# Patient Record
Sex: MALE
[De-identification: names, ages, dates, MRNs, and addresses within clinical notes are randomized per-mention and may not be internally consistent; named-entity substitution may affect disease eponyms.]

---

## 2017-10-10 ENCOUNTER — HOSPITAL (OUTPATIENT)
Dept: OTHER | Age: 47
End: 2017-10-10
Attending: INTERNAL MEDICINE

## 2017-10-10 LAB
DEVICE SN: ABNORMAL
DEVICE SN: NORMAL
POC_GLU: 157 MG/DL (ref 70–99)
POC_GLU: 87 MG/DL (ref 70–99)
TECH_ID: ABNORMAL
TECH_ID: NORMAL

## 2017-10-11 LAB
A/G RATIO_: 0.8
ABS LYMPH: 3 K/CUMM (ref 1–3.5)
ABS MONO: 0.7 K/CUMM (ref 0.1–0.8)
ABS NEUTRO: 3.5 K/CUMM (ref 2–8)
ALBUMIN: 2.4 G/DL (ref 3.5–5)
ALK PHOS: 199 UNIT/L (ref 50–124)
ALT/GPT: 32 UNIT/L (ref 0–55)
ANION GAP SERPL CALC-SCNC: 8 MEQ/L (ref 10–20)
AST/GOT: 16 UNIT/L (ref 5–34)
BASOPHIL: 0 % (ref 0–1)
BILI TOTAL: 1.7 MG/DL (ref 0.2–1)
BUN SERPL-MCNC: 2 MG/DL (ref 6–20)
CALCIUM: 8.9 MG/DL (ref 8.4–10.2)
CHLORIDE: 105 MEQ/L (ref 97–107)
CREATININE: 0.64 MG/DL (ref 0.6–1.3)
DEVICE SN: ABNORMAL
DIFF_TYPE?: ABNORMAL
EOSINOPHIL: 4 % (ref 0–6)
GLOBULIN_: 3.1 G/DL (ref 2–4.1)
GLUCOSE LVL: 122 MG/DL (ref 70–99)
HCT VFR BLD CALC: 24 % (ref 36–51)
HEMOLYSIS 2+: NEGATIVE
HGB BLD-MCNC: 8.4 G/DL (ref 12–17)
IMMATURE GRAN: 0.5 % (ref 0–0.3)
INR: 1.1 (ref 0.9–1.1)
INSTR WBC: 7.6 K/CUMM (ref 4–11)
LIPEMIC 3+: NEGATIVE
LYMPHOCYTE: 39 %
MCH RBC QN AUTO: 36 PG (ref 25–35)
MCHC RBC AUTO-ENTMCNC: 35 G/DL (ref 32–37)
MCV RBC AUTO: 102 FL (ref 78–97)
MONOCYTE: 10 %
NEUTROPHIL: 46 %
NRBC BLD MANUAL-RTO: 0 % (ref 0–0.2)
PLATELET: 245 K/CUMM (ref 150–450)
POC_GLU: 158 MG/DL (ref 70–99)
POC_GLU: 183 MG/DL (ref 70–99)
POC_GLU: 199 MG/DL (ref 70–99)
POC_GLU: 220 MG/DL (ref 70–99)
POC_GLU: 246 MG/DL (ref 70–99)
POC_GLU: 294 MG/DL (ref 70–99)
POTASSIUM: 3.2 MEQ/L (ref 3.5–5.1)
PROTHROMBIN TIME: 11.3 SECONDS (ref 9.7–11.6)
RBC # BLD: 2.36 M/CUMM (ref 4.2–6)
RDW: 18.7 % (ref 11.5–14.5)
SODIUM: 140 MEQ/L (ref 136–145)
TCO2: 30 MEQ/L (ref 19–29)
TECH_ID: ABNORMAL
TOTAL PROTEIN: 5.5 G/DL (ref 6.4–8.3)
WBC # BLD: 7.6 K/CUMM (ref 4–11)

## 2017-10-12 LAB
A/G RATIO_: 0.7
ABS LYMPH: 2 K/CUMM (ref 1–3.5)
ABS MONO: 0.4 K/CUMM (ref 0.1–0.8)
ABS NEUTRO: 5.1 K/CUMM (ref 2–8)
ALBUMIN: 2.3 G/DL (ref 3.5–5)
ALK PHOS: 193 UNIT/L (ref 50–124)
ALT/GPT: 25 UNIT/L (ref 0–55)
ANION GAP SERPL CALC-SCNC: 11 MEQ/L (ref 10–20)
AST/GOT: 15 UNIT/L (ref 5–34)
BASOPHIL: 0 % (ref 0–1)
BILI TOTAL: 1.8 MG/DL (ref 0.2–1)
BUN SERPL-MCNC: 5 MG/DL (ref 6–20)
CALCIUM: 8.6 MG/DL (ref 8.4–10.2)
CHLORIDE: 105 MEQ/L (ref 97–107)
CREATININE: 0.69 MG/DL (ref 0.6–1.3)
DEVICE SN: ABNORMAL
DIFF_TYPE?: ABNORMAL
EOSINOPHIL: 0 % (ref 0–6)
GLOBULIN_: 3.4 G/DL (ref 2–4.1)
GLUCOSE LVL: 217 MG/DL (ref 70–99)
HCT VFR BLD CALC: 26 % (ref 36–51)
HEMOLYSIS 2+: NEGATIVE
HEMOLYSIS 2+: NEGATIVE
HGB BLD-MCNC: 8.6 G/DL (ref 12–17)
IMMATURE GRAN: 0.8 % (ref 0–0.3)
INSTR WBC: 7.6 K/CUMM (ref 4–11)
LIPEMIC 3+: NEGATIVE
LYMPHOCYTE: 26 %
MAGNESIUM LEVEL: 1.5 MG/DL (ref 1.6–2.6)
MCH RBC QN AUTO: 34 PG (ref 25–35)
MCHC RBC AUTO-ENTMCNC: 33 G/DL (ref 32–37)
MCV RBC AUTO: 103 FL (ref 78–97)
MONOCYTE: 6 %
NEUTROPHIL: 66 %
NRBC BLD MANUAL-RTO: 0 % (ref 0–0.2)
PLATELET: 236 K/CUMM (ref 150–450)
POC_GLU: 204 MG/DL (ref 70–99)
POC_GLU: 248 MG/DL (ref 70–99)
POC_GLU: 298 MG/DL (ref 70–99)
POC_GLU: 306 MG/DL (ref 70–99)
POTASSIUM: 3.8 MEQ/L (ref 3.5–5.1)
RBC # BLD: 2.52 M/CUMM (ref 4.2–6)
RDW: 17.8 % (ref 11.5–14.5)
SODIUM: 137 MEQ/L (ref 136–145)
TCO2: 25 MEQ/L (ref 19–29)
TECH_ID: ABNORMAL
TOTAL PROTEIN: 5.7 G/DL (ref 6.4–8.3)
UA APPEAR: CLEAR
UA BILI: NEGATIVE
UA BLOOD: NEGATIVE
UA COLOR: YELLOW
UA GLUCOSE: NEGATIVE
UA KETONES: NEGATIVE
UA LEUK EST: NEGATIVE
UA NITRITE: NEGATIVE
UA PH: 6 (ref 5–7)
UA PROTEIN: NEGATIVE
UA SPEC GRAV: 1.02 (ref 1.01–1.02)
UA UROBILINOGEN: 0.2 MG/DL (ref 0.2–1)
WBC # BLD: 7.6 K/CUMM (ref 4–11)

## 2017-10-13 LAB
A/G RATIO_: 0.7
ABS LYMPH: 2.6 K/CUMM (ref 1–3.5)
ABS MONO: 0.7 K/CUMM (ref 0.1–0.8)
ABS NEUTRO: 4.4 K/CUMM (ref 2–8)
ALBUMIN: 2.3 G/DL (ref 3.5–5)
ALK PHOS: 166 UNIT/L (ref 50–124)
ALT/GPT: 22 UNIT/L (ref 0–55)
ANION GAP SERPL CALC-SCNC: 9 MEQ/L (ref 10–20)
AST/GOT: 14 UNIT/L (ref 5–34)
BASOPHIL: 1 % (ref 0–1)
BILI TOTAL: 1.5 MG/DL (ref 0.2–1)
BUN SERPL-MCNC: 5 MG/DL (ref 6–20)
CALCIUM: 8.3 MG/DL (ref 8.4–10.2)
CHLORIDE: 104 MEQ/L (ref 97–107)
CREATININE: 0.79 MG/DL (ref 0.6–1.3)
DEVICE SN: ABNORMAL
DIFF_TYPE?: ABNORMAL
EOSINOPHIL: 3 % (ref 0–6)
GLOBULIN_: 3.3 G/DL (ref 2–4.1)
GLUCOSE LVL: 344 MG/DL (ref 70–99)
HCT VFR BLD CALC: 26 % (ref 36–51)
HEMOLYSIS 2+: NEGATIVE
HEMOLYSIS 2+: NEGATIVE
HGB BLD-MCNC: 8.6 G/DL (ref 12–17)
IMMATURE GRAN: 0.7 % (ref 0–0.3)
INSTR WBC: 8.1 K/CUMM (ref 4–11)
LIPEMIC 3+: NEGATIVE
LYMPHOCYTE: 33 %
MAGNESIUM LEVEL: 1.7 MG/DL (ref 1.6–2.6)
MCH RBC QN AUTO: 34 PG (ref 25–35)
MCHC RBC AUTO-ENTMCNC: 33 G/DL (ref 32–37)
MCV RBC AUTO: 104 FL (ref 78–97)
MONOCYTE: 8 %
NEUTROPHIL: 54 %
NRBC BLD MANUAL-RTO: 0 % (ref 0–0.2)
PLATELET: 240 K/CUMM (ref 150–450)
PLT ESTIMATE: ADEQUATE
POC_GLU: 138 MG/DL (ref 70–99)
POTASSIUM: 3.7 MEQ/L (ref 3.5–5.1)
RBC # BLD: 2.52 M/CUMM (ref 4.2–6)
RBC MORPH2: ABNORMAL
RBC MORPH: ABNORMAL
RDW: 17.6 % (ref 11.5–14.5)
SODIUM: 137 MEQ/L (ref 136–145)
TCO2: 28 MEQ/L (ref 19–29)
TECH_ID: ABNORMAL
TOTAL PROTEIN: 5.6 G/DL (ref 6.4–8.3)
WBC # BLD: 8.1 K/CUMM (ref 4–11)

## 2018-01-26 ENCOUNTER — HOSPITAL (OUTPATIENT)
Dept: OTHER | Age: 48
End: 2018-01-26
Attending: EMERGENCY MEDICINE

## 2018-01-26 LAB
A/G RATIO_: 1
ABS LYMPH: 4.8 K/CUMM (ref 1–3.5)
ABS MONO: 0.9 K/CUMM (ref 0.1–0.8)
ABS NEUTRO: 5.6 K/CUMM (ref 2–8)
ALBUMIN: 3.7 G/DL (ref 3.5–5)
ALK PHOS: 126 UNIT/L (ref 50–124)
ALT/GPT: 15 UNIT/L (ref 0–55)
ANION GAP SERPL CALC-SCNC: 15 MEQ/L (ref 10–20)
AST/GOT: 14 UNIT/L (ref 5–34)
BASOPHIL: 1 % (ref 0–1)
BILI TOTAL: 0.3 MG/DL (ref 0.2–1)
BUN SERPL-MCNC: 18 MG/DL (ref 6–20)
CALCIUM: 9.6 MG/DL (ref 8.4–10.2)
CHLORIDE: 100 MEQ/L (ref 97–107)
CREATININE: 0.94 MG/DL (ref 0.6–1.3)
DIFF_TYPE?: ABNORMAL
EOSINOPHIL: 2 % (ref 0–6)
GLOBULIN_: 3.6 G/DL (ref 2–4.1)
GLUCOSE LVL: 322 MG/DL (ref 70–99)
HCT VFR BLD CALC: 46 % (ref 36–51)
HEMOLYSIS 2+: ABNORMAL
HEMOLYSIS 4+: NEGATIVE
HEMOLYSIS 4+: NORMAL
HGB BLD-MCNC: 15.8 G/DL (ref 12–17)
ICTERIC 4+: NEGATIVE
IMMATURE GRAN: 0.5 % (ref 0–0.3)
INSTR WBC: 11.7 K/CUMM (ref 4–11)
LACTIC ACID: 1.7 MMOL/L (ref 0.5–2.2)
LIPASE LEVEL: 43 UNIT/L (ref 8–78)
LIPEMIC 1+: NEGATIVE
LIPEMIC 3+: NEGATIVE
LYMPHOCYTE: 41 %
MCH RBC QN AUTO: 31 PG (ref 25–35)
MCHC RBC AUTO-ENTMCNC: 34 G/DL (ref 32–37)
MCV RBC AUTO: 90 FL (ref 78–97)
MONOCYTE: 8 %
NEUTROPHIL: 48 %
NRBC BLD MANUAL-RTO: 0 % (ref 0–0.2)
PLATELET: 193 K/CUMM (ref 150–450)
POTASSIUM: 4.4 MEQ/L (ref 3.5–5.1)
RBC # BLD: 5.15 M/CUMM (ref 4.2–6)
RDW: 13.8 % (ref 11.5–14.5)
SODIUM: 133 MEQ/L (ref 136–145)
TCO2: 22 MEQ/L (ref 19–29)
TOTAL PROTEIN: 7.3 G/DL (ref 6.4–8.3)
TROPONIN I: <0.01 NG/ML
UA APPEAR: CLEAR
UA BILI: NEGATIVE
UA BLOOD: NEGATIVE
UA COLOR: YELLOW
UA GLUCOSE: ABNORMAL
UA KETONES: ABNORMAL
UA LEUK EST: NEGATIVE
UA NITRITE: NEGATIVE
UA PH: 5.5 (ref 5–7)
UA PROTEIN: NEGATIVE
UA SPEC GRAV: 1.02 (ref 1.01–1.02)
UA UROBILINOGEN: 0.2 MG/DL (ref 0.2–1)
WBC # BLD: 11.7 K/CUMM (ref 4–11)

## 2018-04-02 ENCOUNTER — HOSPITAL (OUTPATIENT)
Dept: OTHER | Age: 48
End: 2018-04-02
Attending: INTERNAL MEDICINE

## 2018-04-02 LAB
A/G RATIO_: 1.1
ABS LYMPH: 3.2 K/CUMM (ref 1–3.5)
ABS MONO: 0.7 K/CUMM (ref 0.1–0.8)
ABS NEUTRO: 5 K/CUMM (ref 2–8)
ALBUMIN: 4 G/DL (ref 3.5–5)
ALK PHOS: 107 UNIT/L (ref 50–124)
ALT/GPT: 17 UNIT/L (ref 0–55)
ANION GAP SERPL CALC-SCNC: 12 MEQ/L (ref 10–20)
AST/GOT: 14 UNIT/L (ref 5–34)
BASOPHIL: 1 % (ref 0–1)
BILI TOTAL: 0.4 MG/DL (ref 0.2–1)
BUN SERPL-MCNC: 17 MG/DL (ref 6–20)
CALCIUM: 9.9 MG/DL (ref 8.4–10.2)
CHLORIDE: 103 MEQ/L (ref 97–107)
CREATININE: 0.84 MG/DL (ref 0.6–1.3)
DEVICE SN: ABNORMAL
DEVICE SN: ABNORMAL
DIFF_TYPE?: NORMAL
EOSINOPHIL: 2 % (ref 0–6)
GLOBULIN_: 3.6 G/DL (ref 2–4.1)
GLUCOSE LVL: 278 MG/DL (ref 70–99)
HCT VFR BLD CALC: 46 % (ref 36–51)
HEMOLYSIS 2+: NEGATIVE
HGB BLD-MCNC: 15.9 G/DL (ref 12–17)
IMMATURE GRAN: 0.4 % (ref 0–0.3)
INR: 0.9 (ref 0.9–1.1)
INSTR WBC: 9.2 K/CUMM (ref 4–11)
LIPEMIC 3+: NEGATIVE
LYMPHOCYTE: 35 %
MCH RBC QN AUTO: 33 PG (ref 25–35)
MCHC RBC AUTO-ENTMCNC: 35 G/DL (ref 32–37)
MCV RBC AUTO: 94 FL (ref 78–97)
MONOCYTE: 8 %
NEUTROPHIL: 55 %
NRBC BLD MANUAL-RTO: 0 % (ref 0–0.2)
PLATELET: 193 K/CUMM (ref 150–450)
POC_GLU: 137 MG/DL (ref 70–99)
POC_GLU: 249 MG/DL (ref 70–99)
POTASSIUM: 4.3 MEQ/L (ref 3.5–5.1)
PROTHROMBIN TIME: 9.5 SECONDS (ref 9.7–11.6)
RBC # BLD: 4.85 M/CUMM (ref 4.2–6)
RDW: 12.6 % (ref 11.5–14.5)
SODIUM: 135 MEQ/L (ref 136–145)
TCO2: 24 MEQ/L (ref 19–29)
TECH_ID: ABNORMAL
TECH_ID: ABNORMAL
TOTAL PROTEIN: 7.6 G/DL (ref 6.4–8.3)
WBC # BLD: 9.2 K/CUMM (ref 4–11)

## 2020-05-17 ENCOUNTER — APPOINTMENT (OUTPATIENT)
Dept: CT IMAGING | Facility: HOSPITAL | Age: 50
DRG: 871 | End: 2020-05-17
Attending: EMERGENCY MEDICINE
Payer: MEDICAID

## 2020-05-17 ENCOUNTER — HOSPITAL ENCOUNTER (INPATIENT)
Facility: HOSPITAL | Age: 50
LOS: 11 days | Discharge: HOME | DRG: 871 | End: 2020-05-28
Attending: EMERGENCY MEDICINE | Admitting: HOSPITALIST
Payer: MEDICAID

## 2020-05-17 DIAGNOSIS — A41.9 SEPSIS DUE TO UNDETERMINED ORGANISM (HCC): Primary | ICD-10-CM

## 2020-05-17 DIAGNOSIS — N12 PYELONEPHRITIS: ICD-10-CM

## 2020-05-17 DIAGNOSIS — N17.9 AKI (ACUTE KIDNEY INJURY) (HCC): ICD-10-CM

## 2020-05-17 DIAGNOSIS — E87.1 HYPONATREMIA: ICD-10-CM

## 2020-05-17 DIAGNOSIS — E11.65 TYPE 2 DIABETES MELLITUS WITH HYPERGLYCEMIA, WITHOUT LONG-TERM CURRENT USE OF INSULIN (HCC): ICD-10-CM

## 2020-05-17 PROCEDURE — 74177 CT ABD & PELVIS W/CONTRAST: CPT | Performed by: EMERGENCY MEDICINE

## 2020-05-17 PROCEDURE — 99223 1ST HOSP IP/OBS HIGH 75: CPT | Performed by: HOSPITALIST

## 2020-05-17 RX ORDER — SODIUM CHLORIDE 0.9 % (FLUSH) 0.9 %
3 SYRINGE (ML) INJECTION AS NEEDED
Status: DISCONTINUED | OUTPATIENT
Start: 2020-05-17 | End: 2020-05-28

## 2020-05-17 RX ORDER — INSULIN ASPART 100 [IU]/ML
0.2 INJECTION, SOLUTION INTRAVENOUS; SUBCUTANEOUS ONCE
Status: COMPLETED | OUTPATIENT
Start: 2020-05-17 | End: 2020-05-17

## 2020-05-17 RX ORDER — ONDANSETRON 2 MG/ML
4 INJECTION INTRAMUSCULAR; INTRAVENOUS EVERY 6 HOURS PRN
Status: DISCONTINUED | OUTPATIENT
Start: 2020-05-17 | End: 2020-05-28

## 2020-05-17 RX ORDER — HEPARIN SODIUM 5000 [USP'U]/ML
5000 INJECTION, SOLUTION INTRAVENOUS; SUBCUTANEOUS EVERY 12 HOURS SCHEDULED
Status: COMPLETED | OUTPATIENT
Start: 2020-05-17 | End: 2020-05-24

## 2020-05-17 RX ORDER — SODIUM CHLORIDE 9 MG/ML
INJECTION, SOLUTION INTRAVENOUS CONTINUOUS
Status: DISCONTINUED | OUTPATIENT
Start: 2020-05-17 | End: 2020-05-18

## 2020-05-17 RX ORDER — ACETAMINOPHEN 500 MG
1000 TABLET ORAL ONCE
Status: COMPLETED | OUTPATIENT
Start: 2020-05-17 | End: 2020-05-17

## 2020-05-17 RX ORDER — ONDANSETRON 2 MG/ML
4 INJECTION INTRAMUSCULAR; INTRAVENOUS EVERY 4 HOURS PRN
Status: COMPLETED | OUTPATIENT
Start: 2020-05-17 | End: 2020-05-19

## 2020-05-17 RX ORDER — DEXTROSE MONOHYDRATE 25 G/50ML
50 INJECTION, SOLUTION INTRAVENOUS
Status: DISCONTINUED | OUTPATIENT
Start: 2020-05-17 | End: 2020-05-28

## 2020-05-17 RX ORDER — SODIUM CHLORIDE 9 MG/ML
INJECTION, SOLUTION INTRAVENOUS CONTINUOUS
Status: DISCONTINUED | OUTPATIENT
Start: 2020-05-17 | End: 2020-05-19

## 2020-05-17 RX ORDER — ONDANSETRON 2 MG/ML
4 INJECTION INTRAMUSCULAR; INTRAVENOUS ONCE
Status: COMPLETED | OUTPATIENT
Start: 2020-05-17 | End: 2020-05-17

## 2020-05-17 NOTE — ED INITIAL ASSESSMENT (HPI)
Pt reports vomiting x3 days. Reports dizziness and lack of energy. AOx4. Denies abd pain, urinary symptoms, fever.

## 2020-05-17 NOTE — ED NOTES
Cultures drawn by [facility] PCT at 1825.   Abx started at [facility]

## 2020-05-18 ENCOUNTER — APPOINTMENT (OUTPATIENT)
Dept: CV DIAGNOSTICS | Facility: HOSPITAL | Age: 50
DRG: 871 | End: 2020-05-18
Attending: HOSPITALIST
Payer: MEDICAID

## 2020-05-18 PROCEDURE — 99255 IP/OBS CONSLTJ NEW/EST HI 80: CPT | Performed by: INTERNAL MEDICINE

## 2020-05-18 PROCEDURE — 93306 TTE W/DOPPLER COMPLETE: CPT | Performed by: HOSPITALIST

## 2020-05-18 PROCEDURE — 99221 1ST HOSP IP/OBS SF/LOW 40: CPT | Performed by: NURSE PRACTITIONER

## 2020-05-18 PROCEDURE — 99222 1ST HOSP IP/OBS MODERATE 55: CPT | Performed by: INTERNAL MEDICINE

## 2020-05-18 PROCEDURE — 99233 SBSQ HOSP IP/OBS HIGH 50: CPT | Performed by: HOSPITALIST

## 2020-05-18 RX ORDER — METOPROLOL TARTRATE 5 MG/5ML
5 INJECTION INTRAVENOUS EVERY 6 HOURS PRN
Status: DISCONTINUED | OUTPATIENT
Start: 2020-05-18 | End: 2020-05-28

## 2020-05-18 RX ORDER — ACETAMINOPHEN 325 MG/1
650 TABLET ORAL EVERY 6 HOURS PRN
Status: DISCONTINUED | OUTPATIENT
Start: 2020-05-18 | End: 2020-05-28

## 2020-05-18 NOTE — PLAN OF CARE
Patient alert, reports feeling weak and ill. No episodes of vomiting. Received zofran before lunch. S/S and base dose insulin Novolog and levemir ordered per Endocrinology. IV abx infusing. Replacing electrolytes per Nephrology.    Urology consult pen request assistance  - Assess pain using appropriate pain scale  - Administer analgesics based on type and severity of pain and evaluate response  - Implement non-pharmacological measures as appropriate and evaluate response  - Consider cultural and social Administer ordered medications to maintain glucose within target range  - Assess barriers to adequate nutritional intake and initiate nutrition consult as needed  - Instruct patient on self management of diabetes  Outcome: Progressing  Goal: Hemodynamic st cognitive ability or social support system  Outcome: Not Progressing     Problem: METABOLIC/FLUID AND ELECTROLYTES - ADULT  Goal: Electrolytes maintained within normal limits  Description  INTERVENTIONS:  - Monitor labs and rhythm and assess patient for si

## 2020-05-18 NOTE — ED PROVIDER NOTES
Patient Seen in: [facility] AND [facility] Emergency Department    History   Patient presents with:  Vomiting  Dizziness      HPI    The patient presents to the ED complaining of feeling ill for the past 3 days.   Reports nausea and frequent vomiting over the pas Device None (Room air)       All measures to prevent infection transmission during my interaction with the patient were taken. The patient was already wearing a droplet mask on my arrival to the room.  Personal protective equipment including droplet mask, e Urine Large (*)     Protein Urine 100  (*)     WBC Urine 20 (*)     RBC URINE 8 (*)     Bacteria Urine Few (*)     All other components within normal limits   MANUAL DIFFERENTIAL - Abnormal; Notable for the following components:    Neutrophil Absolute [facility] GLUCOSE - Abnormal; Notable for the following components:    POC Glucose  304 (*)     All other components within normal limits   CBC W/ DIFFERENTIAL - Abnormal; Notable for the following components:    WBC 27.4 (*)     Neutrophil Absolute Prelim 21.99 (*) intrarenal/perinephric fluid collection is apparent at this time to suggest abscess. However, short-term post treatment follow-up is advised as the appearance of the left kidney raises suspicion for impending abscess formation.  2. Borderline enlarged left range)     Or   Glucose-Vitamin C (DEX-4) chewable tab 8 tablet (has no administration in time range)   Insulin Regular Human (NOVOLIN R) 100 UNIT/ML injection 1-11 Units (9 Units Subcutaneous Given 5/17/20 1257)   insulin detemir (LEVEMIR) 100 UNIT/ML fle procedures and reviewed those reports. Complicating Factors: The patient already has does not have any pertinent problems on file. to contribute to the complexity of this ED evaluation.     ED Course: Patient presents to the ED with vomiting for the past undetermined organism ([facility] A41.9 5/17/2020     LENIN (acute kidney injury) ([facility] N17.9 5/17/2020     Hyponatremia E87.1 5/17/2020     Pyelonephritis N12 5/17/2020     Sepsis ([facility] A41.9 5/17/2020 Unknown    Type 2 diabetes mellitus with hyperglycemia, witho

## 2020-05-18 NOTE — CONSULTS
[facility]    Report of Consultation    Dallasaura Jefferson Patient Status:  Inpatient    1970 MRN [id number]   Location [facility] 2W/SW Attending Lawrence Gray, [address] [facility] Day # 1 PCP Destiny Hunter MD     Date of Admis reactive. Mild bilateral hydroureteronephrosis without visible obstructing calculus, in keeping with ascending urinary tract infection/vesicoureteral reflux. UA with large blood, negative leuks, WBC 20, RBC 8.   Urine culture preliminary shows > 100,000 testicular pain and nocturia. Musculoskeletal: Negative for gait problem. Neurological: Negative for dizziness and light-headedness. Physical Exam:   Vital Signs:   height is 5' 6\" (1.676 m) and weight is 172 lb 11.2 oz (78.3 kg).  His temporal t most consistent with extensive left greater than right pyelonephritis. No well-defined intrarenal/perinephric fluid collection is apparent at this time to suggest abscess.   However, short-term post treatment follow-up is advised as the appearance of the l feels like he is emptying his bladder adequately. Kidney function improving. Hold off on marino placement for now. Patient is diabetic and poorly compliant. Endocrinology is following. Recommendations:  1.  Continue meropenem pending urine cultu

## 2020-05-18 NOTE — PROGRESS NOTES
ADMISSION NOTE    48year old male noncompliant diabetic,  presents with vomiting, dizziness and fatigue. Found to have wbc 27k  with bilateral pyelonephritis, LENIN and glucose > 500 with Na of 118 . Available medical records partially reviewed.  Dictation

## 2020-05-18 NOTE — PLAN OF CARE
Pt A&O x 4, on room air. Sinus tach on the monitor. 13 beats of SVT at 2229 and hypertensive, Dr Nathaniel Trevino made aware. IV fluids infusing. Zero residuals post void bladder scan x 2. Denies pain or nausea. Pt resting comfortably in bed.  Belongings in the c Identify cognitive and physical deficits and behaviors that affect risk of falls.   - [place] fall precautions as indicated by assessment.  - Educate pt/family on patient safety including physical limitations  - Instruct pt to call for assistance with act

## 2020-05-18 NOTE — PROGRESS NOTES
Report received from [facility]. Pt arrived from ED with belongings (black bag and clothes) to room 221. Connected to monitor. Educated on call light controls and safety precautions.

## 2020-05-18 NOTE — PROGRESS NOTES
[facility] Pharmacy Note:  Renal Dose Adjustment    Meropenem (Merrem) dosing had been previously adjusted by pharmacy due to renal insufficiency for Emeka Llamas. Estimated Creatinine Clearance: 70 mL/min (based on SCr of 1.14 mg/dL).     Due to improved eric

## 2020-05-18 NOTE — PROGRESS NOTES
[place] [facility]    Progress Note    Humberto Gill Patient Status:  Inpatient    1970 MRN [id number]   Location [facility] 2W/SW Attending Israel Crowe, [address] [facility] Se Day # 1 PCP Ayde Corral MD       Subjective:     Pt f nature. Trace left perinephric and perisplenic ascites is also likely reactive. 3. Mild bilateral hydroureteronephrosis without visible obstructing calculus, in keeping with ascending urinary tract infection/vesicoureteral reflux.  4. Fatty liver/hepatomeg consult    Tobacco use disorder.    - counseled on cessation    dvt proph: heparin    Code status:  Full    >35 minutes spent    Misael Overton MD  5/18/2020

## 2020-05-18 NOTE — H&P
[facility]    PATIENT'S NAME: Maria Antonia Sierra   ATTENDING PHYSICIAN: Anitra Benton MD   PATIENT ACCOUNT#:   [id number]    LOCATION:  [address] [place] [address] RECORD #:   [id number]       YOB: 1970  ADMISSION DATE:       05/17/2020 nephrogram involving the inter polar region of the right kidney. Findings are most consistent with extensive left greater than right pyelonephritis.   There was no well-defined interrenal perinephric fluid collection to suggest abscess at this time; maria del carmen about 3 years ago. REVIEW OF SYSTEMS:  Twelve systems were reviewed. The patient denies any URI symptoms. Does report that his appetite has been poor over the past few days. Denies any shortness of breath, cough, chest pain.   There are otherwise no a 14.4 with an INR of 1.14. White count was 27,000 with a hemoglobin of 14 and a platelet count of 664,930. There were 79% neutrophils and 19% bands. ASSESSMENT AND PLAN:    1. Acute bilateral pyelonephritis.   CT scan shows some mild bilateral hydrone heparin, SCDs. 8.   GI prophylaxis. Protonix. 9.   Tobacco use disorder. Discussed cessation. 10. The patient's current clinical status and proposed treatment plan were discussed with him.   All of his questions were answered, and he agreed with the

## 2020-05-18 NOTE — CONSULTS
[facility]    Report of Consultation    Ashwin Landa Patient Status:  Inpatient    1970 MRN [id number]   Location [facility] 2W/SW Attending Grge Patel MD   Hosp Day # 1 PCP Tucker Andrews MD     Date of Adm CC  •  Insulin Aspart Pen (NOVOLOG) 100 UNIT/ML flexpen 1-7 Units, 1-7 Units, Subcutaneous, TID CC  •  Meropenem (MERREM) 500 mg in sodium chloride 0.9% 100 mL MBP, 500 mg, Intravenous, Q8H  •  ondansetron HCl (ZOFRAN) injection 4 mg, 4 mg, Intravenous, Q4 is consulted for inpatient DM management. PLAN:   - Levemir 24 daily  - Novolog 6 along with medium dose CF with meals  - Accuchecks AC and HS  - Hypoglycemia protocol  - Diabetic diet  - Diabetes education consult    Thank you for the consult.  We will

## 2020-05-18 NOTE — CONSULTS
[facility]    PATIENT'S NAME: Judge Haskins   ATTENDING PHYSICIAN: Misael Overton MD   CONSULTING PHYSICIAN: Tiffanie Nicole MD   PATIENT ACCOUNT#:   [id number]    LOCATION:  [address] [place] [address] RECORD #:   [id number]       DATE OF BIRTH: Works as a . He is . FAMILY HISTORY:  Positive for diabetes, but he is not really aware of any family history of kidney problems. REVIEW OF SYSTEMS:  The patient currently states he is feeling much better.   No further nausea and v compliance. 3.   Possible history of hypertension. PLAN:  We will start to wean him off IV fluids. Start diet as per Dr. Ila Antony. Will follow with I and O's, daily weights, and serial chemistries. Replace potassium and phosphorus.   Compliance with

## 2020-05-18 NOTE — ED NOTES
Assumed care of patient from [place]. Patient on full continuous monitoring at this time. Patient currently resting in ED stretcher. He is alert, oriented.  Offered blanket and turned lights down to promote rest.

## 2020-05-18 NOTE — CM/SW NOTE
MDO for Advanced Directives:    Discussed with patient- he prefers sister as primary contact. Explained surrogady  act and progression without HCPOA filled out. HCPOA form provided and he will think about it and fill out later.     [place] Surrogacy [place] order a

## 2020-05-18 NOTE — PAYOR COMM NOTE
ADMITTED TO ICU    ADMISSION REVIEW     Payor: Saira Modi #:  [id number]  Authorization Number: [id number]    Admit date: 5/17/20  Admit time: 1       Admitting Physician:   Attending Physician:  Isela Carranza MD  Primary Care Physician: Mona Butcher and Sexual Activity      Alcohol use: Yes        Frequency: Monthly or less        Drinks per session: 1 or 2        Binge frequency: Never      Drug use: Never      ROS  Pertinent Positives: Nausea vomiting weakness and dehydration  All other organ system has a normal mood and affect. His behavior is normal.   Nursing note and vitals reviewed.       ED Course        Labs Reviewed   BASIC METABOLIC PANEL (8) - Abnormal; Notable for the following components:       Result Value    Glucose 572 (*)     Sodium 118 Glucose  572 (*)     All other components within normal limits   POCT GLUCOSE - Abnormal; Notable for the following components:    POC Glucose  565 (*)     All other components within normal limits   POCT GLUCOSE - Abnormal; Notable for the following compo EKG    Rate, intervals and axes as noted on EKG Report.   Rate: Tachycardic  Rhythm: Sinus Rhythm  Reading: Sinus tachycardia, inferior Q waves, abnormal EKG             Imaging Results Available and Reviewed while in ED: Ct Abdomen Pelvis Iv Contrast, mL (has no administration in time range)   0.9% NaCl infusion ( Intravenous New Bag 5/17/20 2321)   Heparin Sodium (Porcine) 5000 UNIT/ML injection 5,000 Units (5,000 Units Subcutaneous Given 5/17/20 2323)   ondansetron HCl (ZOFRAN) injection 4 mg (has no (!) 143/110   BP Location:   Right arm Right arm   Pulse:  (!) 122 110 107   Resp:  18 (!) 28 (!) 28   Temp: 99.6 °F (37.6 °C)      TempSrc: Temporal      SpO2:  93% 95% 95%   Weight:       Height:         *I personally reviewed and interpreted all ED arian time spent performing procedures.       Condition upon leaving the department: Stable    Disposition and Plan     Clinical Impression:  Sepsis due to undetermined organism ([facility]  (primary encounter diagnosis)  Pyelonephritis  Type 2 diabetes mellitus with h has had no hematemesis. He denies any fever or chills. No cough, shortness of breath. No chest pain. No obvious sick contacts.   He is diabetic and per Care Everywhere, has been admitted for uncontrolled diabetes in the past.  The patient states he does obstructing calculus in keeping with the ascending urinary tract infection or vesicoureteral reflux. There was no bladder distention.   The patient was started on Zosyn in the emergency room, given insulin, and admitted for further evaluation and treatment Temperature was 103.1 in the emergency room, 97.4 after arrival on the medical floor, pulse 110, respiratory rate 20, blood pressure 140/100, O2 saturation 95% on room air. HEENT:  Normocephalic, atraumatic. Pupils equal, reactive. Sclerae anicteric. Blood and urine cultures are pending at this time. 2.   Patient did present septic with a lactic acid greater than 3. He was given sepsis fluid bolus and repeat lactic acid level in 3 hours normalized to 1.1.   We will continue IV hydration, both for seps Lori Craft, RN      acetaminophen (TYLENOL EXTRA STRENGTH) tab 1,000 mg     Date Action Dose Route User    5/17/2020 1834 Given 1000 mg Oral Malon Crigler, RN      Heparin Sodium (Porcine) 5000 UNIT/ML injection 5,000 Units     Date Action Dose Route User    5/18 User    5/18/2020 0501 Given 12.5 mg Oral Elpidioribernabe Monroy RN    5/18/2020 0109 Given 12.5 mg Oral Terell Monroy RN      ondansetron HCl [facility]) injection 4 mg     Date Action Dose Route User    5/17/2020 1644 Given 4 mg Intravenous Deborah Guzman RN room, he had a temperature of 103.1 associated with a heart rate of 130 and a respiratory rate of up to 34. His blood pressures were elevated. Routine laboratory studies showed a blood sugar of 572 with an A1c of 14.0.   His sodium was 118 with a BUN and Turgor is adequate. NECK:  Supple without JVD or lymphadenopathy. LUNGS:  Clear to auscultation percussion. HEART:  Regular rate and rhythm with an S4 but no other gallops, murmurs, or rubs. ABDOMEN:  Soft, flat, nontender.   Without organomegaly, charlie

## 2020-05-19 ENCOUNTER — APPOINTMENT (OUTPATIENT)
Dept: CT IMAGING | Facility: HOSPITAL | Age: 50
DRG: 871 | End: 2020-05-19
Attending: NURSE PRACTITIONER
Payer: MEDICAID

## 2020-05-19 PROCEDURE — 99233 SBSQ HOSP IP/OBS HIGH 50: CPT | Performed by: UROLOGY

## 2020-05-19 PROCEDURE — 74178 CT ABD&PLV WO CNTR FLWD CNTR: CPT | Performed by: NURSE PRACTITIONER

## 2020-05-19 PROCEDURE — 99232 SBSQ HOSP IP/OBS MODERATE 35: CPT | Performed by: INTERNAL MEDICINE

## 2020-05-19 PROCEDURE — 99233 SBSQ HOSP IP/OBS HIGH 50: CPT | Performed by: HOSPITALIST

## 2020-05-19 RX ORDER — HYDROCODONE BITARTRATE AND ACETAMINOPHEN 5; 325 MG/1; MG/1
1 TABLET ORAL EVERY 6 HOURS PRN
Status: DISCONTINUED | OUTPATIENT
Start: 2020-05-19 | End: 2020-05-28

## 2020-05-19 RX ORDER — NICOTINE 21 MG/24HR
1 PATCH, TRANSDERMAL 24 HOURS TRANSDERMAL DAILY
Status: DISCONTINUED | OUTPATIENT
Start: 2020-05-19 | End: 2020-05-28

## 2020-05-19 RX ORDER — SODIUM CHLORIDE 9 MG/ML
INJECTION, SOLUTION INTRAVENOUS CONTINUOUS
Status: DISCONTINUED | OUTPATIENT
Start: 2020-05-19 | End: 2020-05-22

## 2020-05-19 RX ORDER — HYDROMORPHONE HYDROCHLORIDE 1 MG/ML
1 INJECTION, SOLUTION INTRAMUSCULAR; INTRAVENOUS; SUBCUTANEOUS EVERY 4 HOURS PRN
Status: DISCONTINUED | OUTPATIENT
Start: 2020-05-19 | End: 2020-05-28

## 2020-05-19 NOTE — PROGRESS NOTES
[place] [facility] - [facility]    Progress Note    Ashwin Landa Patient Status:  Inpatient    1970 MRN [id number]   Location [facility] 2W/SW Attending Chintan Braun, [address] [facility] Se Day # 2 PCP Tucker Andrews MD       Subjective:     Pt f Increasing pneumobilia throughout the intrahepatic and extrahepatic biliary tree. 6. Borderline left para-aortic lymphadenopathy, which may be reactive.   7. Small to moderate left and small right pleural effusions, with associated compressive atelectasis, pneumobilia. These findings may be related to cholecystectomy state. Request correlation with obstructive laboratory parameters. 6. Small left pleural effusion. Incidentally noted small bilateral hydroceles. 7. Sequelae of remote granulomatous disease.

## 2020-05-19 NOTE — CONSULTS
[place] [facility] - [facility] ID CONSULT NOTE    Xenia Trevino Patient Status:  Inpatient    1970 MRN [id number]   Location [facility] 2W/SW [facility] 2 PCP Sharath North MD       Reason for C Allergies    Medications:    Current Facility-Administered Medications:   •  Insulin Aspart Pen (NOVOLOG) 100 UNIT/ML flexpen 3 Units, 3 Units, Subcutaneous, TID CC  •  sodium phosphate 40 mEq in sodium chloride 0.9% 250 mL IVPB, 40 mEq, Intravenous, Once itching. CARDIOVASCULAR:  No chest pain, chest pressure or chest discomfort  RESPIRATORY:  No shortness of breath, cough or sputum. GASTROINTESTINAL:  No +anorexia, +nausea, +vomiting or diarrhea. No abdominal pain or blood.   GENITOURINARY:  No [place] when he started have generalized weakness and doing self hydration on the drive to [facility]. Sx started 3 days PTA. Also with some left back pain but otherwise no fevers, chills, dysuria, hematuria, abdominal pain or any other focal symptoms.   On adm

## 2020-05-19 NOTE — PROGRESS NOTES
[place] [facility] - [facility]    Progress Note    Reynaldo  Patient Status:  Inpatient    1970 MRN [id number]   Location [facility] 2W/SW Attending Luc Beaulieu, [address] [facility] Day # 2 PCP Yenni Aguilar MD       Subjective:   Chiquita Wood CREATSERUM 0.90 05/19/2020    BUN 18 05/19/2020     (L) 05/19/2020    K 3.4 (L) 05/19/2020     05/19/2020    CO2 25.0 05/19/2020    GLU 70 05/19/2020    CA 8.4 (L) 05/19/2020    ALB 1.7 (L) 05/19/2020    ALKPHO 111 05/18/2020    AST 28 05/18/20 Iv Contrast, No Oral (er)    Result Date: 5/17/2020  CONCLUSION:  1. Markedly abnormal appearance of the left kidney with multifocal left renal striated nephrogram.  Additional focal striated nephrogram involving the interpolar region of the right kidney.

## 2020-05-19 NOTE — PROGRESS NOTES
[facility]  Nephrology Daily Progress Note    Leighton Orozco  [id number]  48year old      HPI:   Leighton Orozco is a 48year old male. Feeling better. Ate breakfast well. No UTI symptoms.        ROS:     Constitutional:  Negative for decr skills appropriate for age    Labs:  Lab Results   Component Value Date    WBC 16.5 05/19/2020    HGB 11.3 05/19/2020    HCT 32.7 05/19/2020    .0 05/19/2020    CREATSERUM 0.90 05/19/2020    BUN 18 05/19/2020     05/19/2020    K 3.4 05/19/2020 visible obstructing calculus, in keeping with ascending urinary tract infection/vesicoureteral reflux. 4. Fatty liver/hepatomegaly. 5. Suspected cholecystectomy. Mild left intrahepatic and extrahepatic biliary ductal dilation with left lobe pneumobilia. Q15 Min PRN **OR** Glucose-Vitamin C (DEX-4) chewable tab 4 tablet, 4 tablet, Oral, Q15 Min PRN **OR** dextrose 50 % injection 50 mL, 50 mL, Intravenous, Q15 Min PRN **OR** glucose (DEX4) oral liquid 30 g, 30 g, Oral, Q15 Min PRN **OR** Glucose-Vitamin C (

## 2020-05-19 NOTE — PROGRESS NOTES
[facility]    Progress Note    Baylee Velazco Patient Status:  Inpatient    1970 MRN [id number]   Location [facility] 2W/SW Attending Mago Pete MD   Hosp Day # 2 PCP Diane Rosenbaum MD     Subjective:  Feels t

## 2020-05-19 NOTE — PLAN OF CARE
HYPOGLYCEMIA  BS= 69  GAVE DEX GLUCOSE  RECHECKING BS  NOTIFYING [facility].

## 2020-05-19 NOTE — PLAN OF CARE
Patient resting comfortably overnight. In the early morning he began having some left flank pain, PRN Tylenol given. He is still having decreased appetite, but reports he is feeling better than when he came in. Vital signs as charted.      Problem: Patient ADULT  Goal: Verbalizes/displays adequate comfort level or patient's stated pain goal  Description  INTERVENTIONS:  - Encourage pt to monitor pain and request assistance  - Assess pain using appropriate pain scale  - Administer analgesics based on type and to be responsible for managing their own health  - Refer to Case Management Department for coordinating discharge planning if the patient needs post-hospital services based on physician/LIP order or complex needs related to functional status, cognitive elgin weight  - Monitor urine specific gravity, serum osmolarity and serum sodium as indicated or ordered  - Monitor response to interventions for patient's volume status, including labs, urine output, blood pressure (other measures as available)  - Encourage or

## 2020-05-20 PROCEDURE — 99233 SBSQ HOSP IP/OBS HIGH 50: CPT | Performed by: HOSPITALIST

## 2020-05-20 PROCEDURE — 99222 1ST HOSP IP/OBS MODERATE 55: CPT | Performed by: SURGERY

## 2020-05-20 PROCEDURE — 99231 SBSQ HOSP IP/OBS SF/LOW 25: CPT | Performed by: NURSE PRACTITIONER

## 2020-05-20 PROCEDURE — 99232 SBSQ HOSP IP/OBS MODERATE 35: CPT | Performed by: INTERNAL MEDICINE

## 2020-05-20 RX ORDER — POTASSIUM CHLORIDE 20 MEQ/1
40 TABLET, EXTENDED RELEASE ORAL EVERY 4 HOURS
Status: COMPLETED | OUTPATIENT
Start: 2020-05-20 | End: 2020-05-20

## 2020-05-20 RX ORDER — PANTOPRAZOLE SODIUM 40 MG/1
40 TABLET, DELAYED RELEASE ORAL
Status: DISCONTINUED | OUTPATIENT
Start: 2020-05-21 | End: 2020-05-28

## 2020-05-20 NOTE — PROGRESS NOTES
[place] [facility]  Progress Note    Kellee Styles Patient Status:  Inpatient    1970 MRN [id number]   Location [facility] 2W/SW Attending Nakul Delgadillo MD   Hosp Day # 3 PCP Daniel Sierra MD       Sub to 14.9 today. Kidney function normalized. Fever curve improving with a 24 hour tmax of 100. 3.       Patient found to have urinary retention on admission of 224 ml. No suprapubic pain.   He is urinating without difficulty.       Patient is diabetic and ascites. 5. Increasing pneumobilia throughout the intrahepatic and extrahepatic biliary tree. 6. Borderline left para-aortic lymphadenopathy, which may be reactive.   7. Small to moderate left and small right pleural effusions, with associated compressive

## 2020-05-20 NOTE — PROGRESS NOTES
Surgery Consult    #pneumobilia sp lap chol in 2017 and ERCP with stent placement for choledocholithiasis. He returned to [address] [place] [address] 2 weeks  for a retained CBD stone and had another stent placed.   He did have a sphincterotomy which can give rise to pneumobil

## 2020-05-20 NOTE — PLAN OF CARE
Patient is a/ox4. RA. Up with standby assist. IVF @ 50 ml/hr. Accucheck ACHS. Febrile today, tylenol given. Continent. Potassium replace. Plan is to transfer to medical floor once a bed becomes available. Will continue to monitor.     Problem: Patient Nikita ADULT  Goal: Verbalizes/displays adequate comfort level or patient's stated pain goal  Description  INTERVENTIONS:  - Encourage pt to monitor pain and request assistance  - Assess pain using appropriate pain scale  - Administer analgesics based on type and to be responsible for managing their own health  - Refer to Case Management Department for coordinating discharge planning if the patient needs post-hospital services based on physician/LIP order or complex needs related to functional status, cognitive elgin weight  - Monitor urine specific gravity, serum osmolarity and serum sodium as indicated or ordered  - Monitor response to interventions for patient's volume status, including labs, urine output, blood pressure (other measures as available)  - Encourage or

## 2020-05-20 NOTE — DIABETES ED
[facility]    Diabetes Education  Note    Tayla Asencio Patient Status:  Inpatient   1970 MRN [id number]  Location [facility] 2W/SW Attending Riaz Foy MD  Hosp Day # 3 PCP Mao Patterson MD    Reason for Visit: symptoms of hypo- and hyperglycemia and treatment  · Actions of long acting insulin  · Introduction to carbohydrates      Patient verbalized understanding, was appreciative of the visit.     Recommendations:    Patient to administer subcutaneously insulin i

## 2020-05-20 NOTE — PROGRESS NOTES
[place] [facility] - [facility]    Progress Note    Erum Jean Patient Status:  Inpatient    1970 MRN [id number]   Location [facility] 2W/SW Attending Fiona Parmar, [address] [facility] Se Day # 3 PCP Poppy Carvajal MD     Subjective:  Feels t

## 2020-05-20 NOTE — PROGRESS NOTES
[place] [facility]    Ashwin Landa Patient Status:  Inpatient    1970 MRN [id number]   Location [facility] 2W/SW Attending Dudley Pabon MD   Hosp Day # 3 PCP Tucker Andrews MD     Subjective:  ROS re chills, dysuria, hematuria, abdominal pain or any other focal symptoms. On admission febrile to 103.1 with WBC 27.4.  UA WBC 20.  Lactic acid of 3.3 with serum bicarb 22. Blood glucose of 572. LENIN. 19% bands. Noted to be hypertensive and tachycardic.

## 2020-05-20 NOTE — PROGRESS NOTES
[facility] Note: Route Optimization for Pantoprazole (PROTONIX)    Patient is currently on Pantoprazole (PROTONIX) 40 mg IV every 24 hours.    The patient meets the criteria to convert to the oral equivalent as established by the IV to Oral conversion pro

## 2020-05-20 NOTE — PLAN OF CARE
Problem: Patient Centered Care  Goal: Patient preferences are identified and integrated in the patient's plan of care  Description  Interventions:  - What would you like us to know as we care for you?  \"I have not eaten in four days\"  - Provide timely, based on type and severity of pain and evaluate response  - Implement non-pharmacological measures as appropriate and evaluate response  - Consider cultural and social influences on pain and pain management  - Manage/alleviate anxiety  - Utilize distractio status, cognitive ability or social support system  Outcome: Progressing     Problem: GASTROINTESTINAL - ADULT  Goal: Minimal or absence of nausea and vomiting  Description  INTERVENTIONS:  - Maintain adequate hydration with IV or PO as ordered and tolerat available)  - Encourage oral intake as appropriate  - Instruct patient on fluid and nutrition restrictions as appropriate  Outcome: Progressing   Patient states he is feeling better, but still does have some left flank pain.  Diastolic blood pressure remain

## 2020-05-20 NOTE — PROGRESS NOTES
[facility]  Progress Note     Crystal Logan  : 1970    Status: Inpatient  Day #: 3    Attending: Erum Fitzgerald MD  PCP: Janie Nye MD      Assessment and Plan     Sepsis due to bilateral pyelonephritis.   Acute bilateral raimundo 6205  Gross per 24 hour   Intake 4050 ml   Output 2230 ml   Net 1820 ml         Recent Labs   Lab 05/18/20  0450 05/19/20  0429 05/20/20  0411   WBC 16.6* 16.5* 14.9*   HGB 12.9* 11.3* 11.5*   HCT 37.3* 32.7* 33.9*   .0 144.0* 148.0*   RBC 4.36 3.77 ascites. 5. Increasing pneumobilia throughout the intrahepatic and extrahepatic biliary tree. 6. Borderline left para-aortic lymphadenopathy, which may be reactive.   7. Small to moderate left and small right pleural effusions, with associated compressive

## 2020-05-21 PROCEDURE — 99232 SBSQ HOSP IP/OBS MODERATE 35: CPT | Performed by: INTERNAL MEDICINE

## 2020-05-21 PROCEDURE — 99232 SBSQ HOSP IP/OBS MODERATE 35: CPT | Performed by: UROLOGY

## 2020-05-21 PROCEDURE — 99233 SBSQ HOSP IP/OBS HIGH 50: CPT | Performed by: HOSPITALIST

## 2020-05-21 PROCEDURE — 99231 SBSQ HOSP IP/OBS SF/LOW 25: CPT | Performed by: SURGERY

## 2020-05-21 NOTE — CONSULTS
[facility]    PATIENT'S NAME: Katharine Barnhart   ATTENDING PHYSICIAN: Neymar Delgado MD   CONSULTING PHYSICIAN: Dariusz Ramey MD   PATIENT ACCOUNT#:   [id number]    LOCATION:  [address] [place] [address] Rd #:   [id number]       YOB: 1970 auscultation. FLANKS:  There is some left flank tenderness. ABDOMEN:  Soft, nontender, nondistended. NEUROLOGIC:  Nonfocal.    IMPRESSION:  Pneumobilia, most likely secondary to his sphincterotomy in the past.  No surgical intervention.      Thank y

## 2020-05-21 NOTE — PROGRESS NOTES
[facility]    Progress Note    Conchetta Huge Patient Status:  Inpatient    1970 MRN [id number]   Location [facility] 2W/SW Attending Florina Mora, [address] [facility] Se Day # 4 PCP Krishna Colón MD     Subjective:  Feels t Sindy Blankenship MD  5/21/2020

## 2020-05-21 NOTE — PLAN OF CARE
Problem: Patient Centered Care  Goal: Patient preferences are identified and integrated in the patient's plan of care  Description  Interventions:  - What would you like us to know as we care for you?  \"I have not eaten in four days\"  - Provide timely, based on type and severity of pain and evaluate response  - Implement non-pharmacological measures as appropriate and evaluate response  - Consider cultural and social influences on pain and pain management  - Manage/alleviate anxiety  - Utilize distractio Glucose maintained within prescribed range  Description  INTERVENTIONS:  - Monitor Blood Glucose as ordered  - Assess for signs and symptoms of hyperglycemia and hypoglycemia  - Administer ordered medications to maintain glucose within target range  - Asse

## 2020-05-21 NOTE — PROGRESS NOTES
Double RN skin check done prior to transfer off Unit. Skin check performed by this RN and Ieva. Wounds are as follows: no wounds     Will remain available for any further questions or concerns.

## 2020-05-21 NOTE — PLAN OF CARE
Blood sugars improved. Eating well. Steady gait.  Pain controlled with norco.  Problem: Patient Centered Care  Goal: Patient preferences are identified and integrated in the patient's plan of care  Description  Interventions:  - What would you like us to kn assistance  - Assess pain using appropriate pain scale  - Administer analgesics based on type and severity of pain and evaluate response  - Implement non-pharmacological measures as appropriate and evaluate response  - Consider cultural and social influenc services based on physician/LIP order or complex needs related to functional status, cognitive ability or social support system  Outcome: Progressing     Problem: GASTROINTESTINAL - ADULT  Goal: Minimal or absence of nausea and vomiting  Description  INTER including labs, urine output, blood pressure (other measures as available)  - Encourage oral intake as appropriate  - Instruct patient on fluid and nutrition restrictions as appropriate  Outcome: Progressing

## 2020-05-21 NOTE — PROGRESS NOTES
[place] [facility]    Progress Note    Merwyn Flatness Patient Status:  Inpatient    1970 MRN [id number]   Location [facility] 2W/SW Attending Rj Day MD   [address] Day # 4 PCP Hood Mccann MD       Subjective:   Negrita Alcala

## 2020-05-21 NOTE — PROGRESS NOTES
[facility]  OPERATIVE REPORT    Name:  Sebastian Rosenbaum  MR#:   [id number]  :  1978  ACCOUNT #:  [de-identified]  DATE OF SERVICE:  10/24/2019    PREOPERATIVE DIAGNOSIS:  Familial painful lipomatosis. POSTOPERATIVE DIAGNOSIS:  Familial painful lipomatosis with multiple subcutaneous soft tissue lipomata in extremities and trunk. PROCEDURE PERFORMED:  Excision of total 20 lipomata from right and left upper extremities and chest areas. SURGEON:  Tiffany Mehta MD    ASSISTANT:  Nicole Cedeno. ANESTHESIA:  General endotracheal.    COMPLICATIONS:  None. SPECIMENS REMOVED:  Containers with specimens from right upper extremity, left upper extremity, and chest area through separate containers. IMPLANTS:  There were no implants placed. ESTIMATED BLOOD LOSS:  Minimal.    PROCEDURE:  This patient has a history of familial lipomatosis with multiple lipomas in the extremities and the trunk area, some of which are extremely painful. The patient had requested removal of as many as possible, and we have decided on a total of 20 of these masses from the left and right upper extremities and 2 large painful ones on the chest area. These were marked in the preop holding area as large circular ink marks surrounding the palpable masses outlined by the patient. He was taken to the operating room, placed on the operating table in the supine position and appropriately prepped and sterilely draped. Each of these masses was approached by way of a short linear incision, allowing for dissection using a scissors to undermine from the surface and then by digital compression to squeeze the mass out of its bed, containing the capsule and then cauterizing the small vascular stalk at its base before placing it in a sterile container. The total of 10 of these tumors were removed from the left upper extremity, and these measured between 2 and 4 cm in size.   A total of eight were removed from the right upper [place] [facility]    Progress Note    Alecia Beaver Patient Status:  Inpatient    1970 MRN [id number]   Location [facility] 2W/SW Attending Edgar Bai, [address] [facility] [address] Day # 7 PCP Huyen Ramos MD     Subjective  Feels we extremity, also averaging 2-4 cm in size, and two specimens were removed from the right side chest and left side upper chest, these being one 4 cm and the other 5 cm. Closure of these incisions was done using Monocryl 4-0 suture and attaching the dermis back to the deep fascia so as to obliterate the deep space in each case, followed by the placement of Prineo Dermabond squares over each of these incisions with the cyanoacrylate adhesive painted on to its surface. No additional dressings were placed, and the patient was able to be awakened and transferred to the recovery room in a stable and semi-awake condition.         Stuart Santiago MD      [id number]/  D:  10/24/2019 23:59  T:  10/25/2019 7:25  JOB #:  [id number] to prevent short and long term complications from Diabetes.      Lois Genao MD

## 2020-05-21 NOTE — PROGRESS NOTES
[facility]    Progress Note    Danny Osborne Patient Status:  Inpatient    1970 MRN [id number]   Location [facility] 2W/SW Attending Mary Hernández MD   Hosp Day # 4 PCP Odell Dewitt MD     Assessment and Plan:     Abran Bruner 05/21/2020    CO2 26.0 05/21/2020     (H) 05/21/2020    CA 8.4 (L) 05/21/2020    ALB 1.7 (L) 05/20/2020    ALKPHO 111 05/18/2020    BILT 0.5 05/18/2020    TP 6.8 05/18/2020    AST 28 05/18/2020    ALT 20 05/18/2020    INR 1.14 05/17/2020    MG 2.1 0

## 2020-05-21 NOTE — PROGRESS NOTES
[place] [facility]    Reyna Cardenas Patient Status:  Inpatient    1970 MRN [id number]   Location [facility] 2W/SW Attending Chester Briseno MD   Hosp Day # 4 PCP Juana Petersen MD     Subjective:  ROS re self hydration on the drive to [facility]. Sx started 3 days PTA. Also with some left back pain but otherwise no fevers, chills, dysuria, hematuria, abdominal pain or any other focal symptoms.   On admission febrile to 103.1 with WBC 27.4.  UA WBC 20.  Lactic

## 2020-05-21 NOTE — PROGRESS NOTES
[facility]  Progress Note     Reba Homero  : 1970    Status: Inpatient  Day #: 4    Attending: Julieta Alfaro MD  PCP: Gina Cortez MD      Assessment and Plan     Sepsis due to bilateral pyelonephritis.   Acute bilateral raimundo nonfocal  Psychiatric:  Normal affect, calm and appropriate    Intake/Output Summary (Last 24 hours) at 5/21/2020 1336  Last data filed at 5/21/2020 3857  Gross per 24 hour   Intake 1905 ml   Output 2110 ml   Net -205 ml         Recent Labs   Lab 05/19/20 Heparin Sodium (Porcine)  5,000 Units Subcutaneous 2 times per day      PRN Meds: HYDROmorphone HCl, HYDROcodone-acetaminophen, acetaminophen, metoprolol Tartrate, Normal Saline Flush, ondansetron HCl, glucose **OR** Glucose-Vitamin C **OR** dextrose **OR*

## 2020-05-22 PROCEDURE — 99232 SBSQ HOSP IP/OBS MODERATE 35: CPT | Performed by: UROLOGY

## 2020-05-22 PROCEDURE — 99233 SBSQ HOSP IP/OBS HIGH 50: CPT | Performed by: HOSPITALIST

## 2020-05-22 NOTE — PLAN OF CARE
Pt denies sob, chills, fever, n/v. Pain controled with medication. IV fluids run without difficulty, no redness, no infiltration. Medication reviewed. Safety maintained. Call light in reach. We will continue to monitor.   Problem: Patient [address] Verbalizes/displays adequate comfort level or patient's stated pain goal  Description  INTERVENTIONS:  - Encourage pt to monitor pain and request assistance  - Assess pain using appropriate pain scale  - Administer analgesics based on type and severity of responsible for managing their own health  - Refer to Case Management Department for coordinating discharge planning if the patient needs post-hospital services based on physician/LIP order or complex needs related to functional status, cognitive ability o Monitor urine specific gravity, serum osmolarity and serum sodium as indicated or ordered  - Monitor response to interventions for patient's volume status, including labs, urine output, blood pressure (other measures as available)  - Encourage oral intake

## 2020-05-22 NOTE — PLAN OF CARE
Problem: Patient Centered Care  Goal: Patient preferences are identified and integrated in the patient's plan of care  Description  Interventions:  - What would you like us to know as we care for you?  \"I have not eaten in four days\"  - Provide timely, based on type and severity of pain and evaluate response  - Implement non-pharmacological measures as appropriate and evaluate response  - Consider cultural and social influences on pain and pain management  - Manage/alleviate anxiety  - Utilize distractio status, cognitive ability or social support system  Outcome: Progressing     Problem: GASTROINTESTINAL - ADULT  Goal: Minimal or absence of nausea and vomiting  Description  INTERVENTIONS:  - Maintain adequate hydration with IV or PO as ordered and tolerat available)  - Encourage oral intake as appropriate  - Instruct patient on fluid and nutrition restrictions as appropriate  Outcome: Progressing English

## 2020-05-22 NOTE — PROGRESS NOTES
[facility]  [facility] Note     Marsha Mejia  : 1970    Status: Inpatient  Day #: 5    Attending: Pratima Gonzalez MD  PCP: Isidro Saunders MD      Assessment and Plan     Sepsis due to bilateral pyelonephritis.   Acute bilateral raimundo edema, no cyanosis, no clubbing  Neurologic:  nonfocal  Psychiatric:  Normal affect, calm and appropriate    Intake/Output Summary (Last 24 hours) at 5/22/2020 0911  Last data filed at 5/22/2020 0720  Gross per 24 hour   Intake 1770.83 ml   Output 3575 ml nicotine  1 patch Transdermal Daily   • Insulin Aspart Pen  1-7 Units Subcutaneous TID CC   • metoprolol Tartrate  25 mg Oral 2x Daily(Beta Blocker)   • Heparin Sodium (Porcine)  5,000 Units Subcutaneous 2 times per day      PRN Meds: HYDROmorphone HCl, HY

## 2020-05-22 NOTE — PROGRESS NOTES
[facility]    Progress Note    Alecia Beaver Patient Status:  Inpatient    1970 MRN [id number]   Location [facility] 5SW/SE Attending Jourdan Henley MD   [facility] Day # 5 PCP Huyen Ramos MD       Subjective:   Steve Diaz

## 2020-05-22 NOTE — PAYOR COMM NOTE
--------------  CONTINUED STAY REVIEW    Payor: John Schmitzkorey #:  [id number]  Authorization Number: [id number]    Admit date: 5/17/20  Admit time: 1    Admitting Physician: Debora Fish MD  Attending Physician:  So Howell MD  Primary Care Physic  Continue on IV cefepime. If continued improvement, anticipate DC on PO ciprofloxacin until 6/2/20.  -  Repeat blood cx NGTD. -  Follow fever curve, wbc.  Repeat CBC/diff     PER UROLOGY  Assessment and Plan:   Recommend:  -Check post void residual.  If gr Route User    5/22/2020 9651 Given 2 Units Subcutaneous (Left Upper Abdomen) Livia Bhatti RN    5/21/2020 1846 Given 2 Units Subcutaneous (Left Lower Abdomen) Livia Bhatti RN    5/21/2020 1325 Given 2 Units Subcutaneous (Left Upper Arm) Watson

## 2020-05-23 ENCOUNTER — APPOINTMENT (OUTPATIENT)
Dept: GENERAL RADIOLOGY | Facility: HOSPITAL | Age: 50
DRG: 871 | End: 2020-05-23
Attending: UROLOGY
Payer: MEDICAID

## 2020-05-23 ENCOUNTER — APPOINTMENT (OUTPATIENT)
Dept: CT IMAGING | Facility: HOSPITAL | Age: 50
DRG: 871 | End: 2020-05-23
Attending: UROLOGY
Payer: MEDICAID

## 2020-05-23 PROCEDURE — 99232 SBSQ HOSP IP/OBS MODERATE 35: CPT | Performed by: INTERNAL MEDICINE

## 2020-05-23 PROCEDURE — 99233 SBSQ HOSP IP/OBS HIGH 50: CPT | Performed by: HOSPITALIST

## 2020-05-23 PROCEDURE — 99233 SBSQ HOSP IP/OBS HIGH 50: CPT | Performed by: UROLOGY

## 2020-05-23 PROCEDURE — 71046 X-RAY EXAM CHEST 2 VIEWS: CPT | Performed by: UROLOGY

## 2020-05-23 PROCEDURE — 74178 CT ABD&PLV WO CNTR FLWD CNTR: CPT | Performed by: UROLOGY

## 2020-05-23 NOTE — PLAN OF CARE
Problem: Patient Centered Care  Goal: Patient preferences are identified and integrated in the patient's plan of care  Description  Interventions:  - What would you like us to know as we care for you?  \"I have not eaten in four days\"  - Provide timely,

## 2020-05-23 NOTE — PROGRESS NOTES
[facility] JAYLAN ID PROGRESS NOTE    Betinajennifer Akhtar Patient Status:  Inpatient    1970 MRN [id number]   Location [facility] 2W/SW Attending Lazara Booth MD   Hosp Day # 6 PCP Margy Quinn MD     Subjective:  ROS re hydration on the drive to [facility]. Sx started 3 days PTA. Also with some left back pain but otherwise no fevers, chills, dysuria, hematuria, abdominal pain or any other focal symptoms.   On admission febrile to 103.1 with WBC 27.4.  UA WBC 20.  Lactic acid

## 2020-05-23 NOTE — PROGRESS NOTES
Dallas Jefferson is a 48year old male. HPI:   Patient presents with:  Vomiting  Dizziness    WEEKEND CALL COVERING DR. Ragini Manzanares      History provided by patient and review of records    Admitted 5/17/2020 with urosepsis. Pancultured.   Patient initially on (TYLENOL) tab 650 mg, 650 mg, Oral, Q6H PRN  •  metoprolol Tartrate (LOPRESSOR) 5 MG/5ML injection 5 mg, 5 mg, Intravenous, Q6H PRN  •  Insulin Aspart Pen (NOVOLOG) 100 UNIT/ML flexpen 1-7 Units, 1-7 Units, Subcutaneous, TID CC  •  metoprolol Tartrate (LOP Results (last three years):  Recent Labs     05/17/20  1550   COLORUR Yellow   CLARITY Hazy*   SPECGRAVITY 1.018   PHURINE 6.0   PROUR 100 *   GLUUR >=500*   KETUR 20 *   BILUR Negative   BLOODURINE Large*   NITRITE Negative   UROBILINOGEN <2.0   LEUUR Neg changes throughout the spine. Bladder Scan Volume (mL): 69 mL      Review of previous records:    Admitted 5/17/2020 with urosepsis; temperature 103.1; CBC WBC 27,400. Urine culture Klebsiella pneumoniae pneumoniae.   Initially treated with IV Zosyn, superimposed pneumonia\"     Dr. Miladis Paula M.D.                    Brown Dyson MD

## 2020-05-23 NOTE — PROGRESS NOTES
[facility]  Hospitalist Progress Note     Oralee Arms Patient Status:  Inpatient    1970  48year old CSN [id number]   Location 542/542-A Attending Isis Da Silva MD   Hosp Day # 6 PCP Tori Maddox MD     ASSESSMENT/PLAN 29.9 29.7 29.1   MCHC 33.8 33.3 32.8   RDW 13.9 14.0 14.1   NEPRELIM 8.36* 11.04* 12.67*   WBC 10.9 14.3* 16.7*   .0 257.0 333.0     Recent Labs   Lab 05/17/20  1550 05/17/20  2256 05/18/20  0450 05/19/20  0429 05/20/20  0411  05/21/20  0341 05/22/2 spent discussing his lab work, particularly his white blood cell count. Discussed urine culture results. We discussed plan for reimaging versus discharge tomorrow.

## 2020-05-23 NOTE — PLAN OF CARE
Pt denies sob, chest pain, n/v. Pain controled with medication. Medication reviewed, Call light in reach. Reinforced teaching in insulin administration. Pt returned teach back. Safety maintained. Call light in reach. We will continue to monitor.   Problem: - ADULT  Goal: Verbalizes/displays adequate comfort level or patient's stated pain goal  Description  INTERVENTIONS:  - Encourage pt to monitor pain and request assistance  - Assess pain using appropriate pain scale  - Administer analgesics based on type a ability to be responsible for managing their own health  - Refer to Case Management Department for coordinating discharge planning if the patient needs post-hospital services based on physician/LIP order or complex needs related to functional status, cogni weight  - Monitor urine specific gravity, serum osmolarity and serum sodium as indicated or ordered  - Monitor response to interventions for patient's volume status, including labs, urine output, blood pressure (other measures as available)  - Encourage or

## 2020-05-24 PROCEDURE — 99233 SBSQ HOSP IP/OBS HIGH 50: CPT | Performed by: UROLOGY

## 2020-05-24 PROCEDURE — 99232 SBSQ HOSP IP/OBS MODERATE 35: CPT | Performed by: INTERNAL MEDICINE

## 2020-05-24 PROCEDURE — 99233 SBSQ HOSP IP/OBS HIGH 50: CPT | Performed by: HOSPITALIST

## 2020-05-24 NOTE — PLAN OF CARE
Started meropenem last night per ID. Notified Dr Tr Werner and ID of CT results. Afebrile, vitals stable.     Problem: Patient Centered Care  Goal: Patient preferences are identified and integrated in the patient's plan of care  Description  Interventions: monitor pain and request assistance  - Assess pain using appropriate pain scale  - Administer analgesics based on type and severity of pain and evaluate response  - Implement non-pharmacological measures as appropriate and evaluate response  - Consider cul patient needs post-hospital services based on physician/LIP order or complex needs related to functional status, cognitive ability or social support system  Outcome: Progressing     Problem: GASTROINTESTINAL - ADULT  Goal: Minimal or absence of nausea and for patient's volume status, including labs, urine output, blood pressure (other measures as available)  - Encourage oral intake as appropriate  - Instruct patient on fluid and nutrition restrictions as appropriate  Outcome: Progressing

## 2020-05-24 NOTE — PROGRESS NOTES
[place] [facility]    Progress Note    Ashwin Landa Patient Status:  Inpatient    1970 MRN [id number]   Location [facility] 2W/SW Attending Chintan Braun, [address] [facility] Se Day # 7 PCP Tucker Andrews MD     Subjective  Feels we MD

## 2020-05-24 NOTE — PROGRESS NOTES
[place] [facility]    Liz Solis Patient Status:  Inpatient    1970 MRN [id number]   Location [facility] 2W/SW Attending Mor Shipley MD   Hosp Day # 7 PCP Charli Del Toro MD     Subjective:  Spiked meropenem)     72-year-old male with a history of diabetes x4 years not on meds, poorly controlled with A1c of 14 who is a  and was in [place] when he started have generalized weakness and doing self hydration on the drive to [facility].  Sx started [facility]  [phone]    ID Attending:  Labs and scans reviewed. All issues discussed. Agree with PA notes and plan  Cont IV meropenem.  Anticipate at least 14 days Abx (may need up to 6 weeks Rx)  Await IR drainage

## 2020-05-24 NOTE — PROGRESS NOTES
Mallory Watkins is a 48year old male. HPI:   Patient presents with:  Vomiting  Dizziness    WEEKEND CALL COVERING DR. Richardson Generous      History provided by patient and review of records    Admitted 5/17/2020 with urosepsis. Pancultured.   Patient initially on 650 mg, Oral, Q6H PRN  •  metoprolol Tartrate (LOPRESSOR) 5 MG/5ML injection 5 mg, 5 mg, Intravenous, Q6H PRN  •  Insulin Aspart Pen (NOVOLOG) 100 UNIT/ML flexpen 1-7 Units, 1-7 Units, Subcutaneous, TID CC  •  metoprolol Tartrate (LOPRESSOR) tab 25 mg, 25 Labs     05/17/20  1550   COLORUR Yellow   CLARITY Hazy*   SPECGRAVITY 1.018   PHURINE 6.0   PROUR 100 *   GLUUR >=500*   KETUR 20 *   BILUR Negative   BLOODURINE Large*   NITRITE Negative   UROBILINOGEN <2.0   LEUUR Negative   WBCUR 20*   RBCUR 8*   BACUR left kidney with extension of gas beyond the margins of the posterior left renal cortex and into left ureters fascia. ... Left kidney demonstrates impaired excretion with suggestion of ATN of left kidney. ...  Heterogeneous enhancement of right kidney concern cefepime which patient was receiving until evening of 5/23/2020, but then with worsening fever, worsening leukocytosis and new CT showing developing left perirenal abscess, switched back to IV meropenem  6. Diabetes mellitus--poorly compliant  7.   [place]

## 2020-05-24 NOTE — PROGRESS NOTES
[facility]  Progress Note     Leighton Orozco  : 1970    Status: Inpatient  Day #: 7    Attending: Nitish Saldivar MD  PCP: Beto Segura MD      Assessment and Plan     Sepsis due to bilateral pyelonephritis.   Acute bilateral raimundo 118/68  General:  Alert, no distress  HEENT:  Normocephalic, atraumatic  Neck:  Supple, symmetrical  Cardiac:  Regular rate, regular rhythm  Pulmonary:  Clear to auscultation bilaterally, respirations unlabored  Gastrointestinal:  Soft, non-tender, normal 160*  --  104 111  --   --   --   --   --   --    TP 8.4*  --  6.5 6.8  --   --   --   --   --   --    INR 1.14  --   --   --   --   --   --   --   --   --     < > = values in this interval not displayed.        Xr Chest Pa + Lat Chest (cpt=57784)    Result Anasarca. 14. Sequela of remote granulomatous disease. 15. Lesser incidental findings as above. Remote.    Dictated by (CST): Sybil Spencer MD on 5/23/2020 at 7:20 PM     Finalized by (CST): Sybil Spencer MD on 5/23/2020 at 7:37 PM               Me

## 2020-05-24 NOTE — PLAN OF CARE
Problem: Diabetes/Glucose Control  Goal: Glucose maintained within prescribed range  Description  INTERVENTIONS:  - Monitor Blood Glucose as ordered  - Assess for signs and symptoms of hyperglycemia and hypoglycemia  - Administer ordered medications to m Administer ordered medications to maintain glucose within target range  - Assess barriers to adequate nutritional intake and initiate nutrition consult as needed  - Instruct patient on self management of diabetes  Outcome: Progressing  Goal: Electrolytes m

## 2020-05-25 ENCOUNTER — APPOINTMENT (OUTPATIENT)
Dept: CT IMAGING | Facility: HOSPITAL | Age: 50
DRG: 871 | End: 2020-05-25
Attending: HOSPITALIST
Payer: MEDICAID

## 2020-05-25 PROCEDURE — 74178 CT ABD&PLV WO CNTR FLWD CNTR: CPT | Performed by: HOSPITALIST

## 2020-05-25 PROCEDURE — 99356 PROLONGED SERV,INPATIENT,1ST HR: CPT | Performed by: UROLOGY

## 2020-05-25 PROCEDURE — 99233 SBSQ HOSP IP/OBS HIGH 50: CPT | Performed by: HOSPITALIST

## 2020-05-25 PROCEDURE — 99232 SBSQ HOSP IP/OBS MODERATE 35: CPT | Performed by: UROLOGY

## 2020-05-25 RX ORDER — HEPARIN SODIUM 5000 [USP'U]/ML
5000 INJECTION, SOLUTION INTRAVENOUS; SUBCUTANEOUS EVERY 12 HOURS SCHEDULED
Status: DISCONTINUED | OUTPATIENT
Start: 2020-05-25 | End: 2020-05-25

## 2020-05-25 RX ORDER — POTASSIUM CHLORIDE 20 MEQ/1
40 TABLET, EXTENDED RELEASE ORAL EVERY 4 HOURS
Status: DISPENSED | OUTPATIENT
Start: 2020-05-25 | End: 2020-05-25

## 2020-05-25 NOTE — PROGRESS NOTES
[facility]  Progress Note     Reyna Cardenas  : 1970    Status: Inpatient  Day #: 8    Attending: Keri Alcocer MD  PCP: Juana Petersen MD      Assessment and Plan     Sepsis due to bilateral pyelonephritis.   Acute bilateral raimundo [16-18] 18  BP: (130-139)/(78-83) 134/78  General:  Alert, no distress  HEENT:  Normocephalic, atraumatic  Neck:  Supple, symmetrical  Cardiac:  Regular rate, regular rhythm  Pulmonary:  Clear to auscultation bilaterally, respirations unlabored  [place] Abdomen+pelvis(all W+wo)(cpt=74178)    Result Date: 5/23/2020  CONCLUSION:  1. Redemonstration of extensive emphysematous pyelonephritis. Gas is seen dissecting through the renal cortex into the left perirenal space.   2. Interval development of a partially Units Subcutaneous TID CC   • metoprolol Tartrate  25 mg Oral 2x Daily(Beta Blocker)      PRN Meds: HYDROmorphone HCl, HYDROcodone-acetaminophen, acetaminophen, metoprolol Tartrate, Normal Saline Flush, ondansetron HCl, glucose **OR** Glucose-Vitamin C **O

## 2020-05-25 NOTE — PLAN OF CARE
NPO since midnight for possible drain insertion. No c/o pain, vitals stable.     Problem: Patient Centered Care  Goal: Patient preferences are identified and integrated in the patient's plan of care  Description  Interventions:  - What would you like us to assistance  - Assess pain using appropriate pain scale  - Administer analgesics based on type and severity of pain and evaluate response  - Implement non-pharmacological measures as appropriate and evaluate response  - Consider cultural and social influenc services based on physician/LIP order or complex needs related to functional status, cognitive ability or social support system  Outcome: Progressing     Problem: GASTROINTESTINAL - ADULT  Goal: Minimal or absence of nausea and vomiting  Description  INTER including labs, urine output, blood pressure (other measures as available)  - Encourage oral intake as appropriate  - Instruct patient on fluid and nutrition restrictions as appropriate  Outcome: Progressing

## 2020-05-25 NOTE — PROGRESS NOTES
Odalys Roland is a 48year old male. HPI:   Patient presents with:  Vomiting  Dizziness    WEEKEND CALL COVERING DR. Obie Rosenbaum      History provided by patient and review of records    Admitted 5/17/2020 with urosepsis. Pancultured.   Patient initially on Nightly  •  Insulin Aspart Pen (NOVOLOG) 100 UNIT/ML flexpen 6 Units, 6 Units, Subcutaneous, TID CC  •  meropenem (MERREM) 1 g in sodium chloride 0.9% 100 mL MBP, 1 g, Intravenous, Q8H  •  Pantoprazole Sodium (PROTONIX) EC tab 40 mg, 40 mg, Oral, QAM AC  • 05/17/20  1550 05/17/20  2256 05/18/20  0450 05/19/20  0429 05/20/20  0411 05/21/20  0341 05/22/20  0601 05/23/20  0737 05/24/20  0617 05/25/20  0610   BUN 23* 18  18 16 18 16 16 15 14 11 11   CREATSERUM 1.71* 1.21  1.21 1.14 0.90 0.93 0.91 0.98 1.09 0.87 margins of Gerota's fascia. Overall, this is not significantly intervally changed compared to 5/17/2020 CT. There is no significant hydronephrosis or hydroureter.    2. Interval development of a 10.2 x 2.7 x 10.8 cm partially loculated left pericapsular flu cefepime back to IV meropenem. 5/23/2020 patient evaluated by infectious disease physician Dr. Lilian Valderrama.      I spent 55  minutes with patient, and the course of taking his history; examining him; reviewing labs; reviewing imaging studies; discussing treat telephone discussions with interventional radiologist Daniel Morrissey, and hospitalist Dr. Tiffanie Worrell, and also with the urologist Dr. Cyrus Hansen and also with patient.   Initially patient was scheduled to undergo 5/25/2020 percutaneous drainage of left perianal abscess b

## 2020-05-25 NOTE — PROGRESS NOTES
[place] [facility] - [facility]    Progress Note    [facility] Patient Status:  Inpatient    1970 MRN [id number]   Location [facility] 2W/SW Attending Dioni Chan, [address] [facility] Se Day # 9 PCP Dione Castle MD     Subjective  Eating w Jaun Handy MD

## 2020-05-26 ENCOUNTER — APPOINTMENT (OUTPATIENT)
Dept: CT IMAGING | Facility: HOSPITAL | Age: 50
DRG: 871 | End: 2020-05-26
Attending: UROLOGY
Payer: MEDICAID

## 2020-05-26 ENCOUNTER — APPOINTMENT (OUTPATIENT)
Dept: CV DIAGNOSTICS | Facility: HOSPITAL | Age: 50
DRG: 871 | End: 2020-05-26
Attending: HOSPITALIST
Payer: MEDICAID

## 2020-05-26 PROCEDURE — 0T9130Z DRAINAGE OF LEFT KIDNEY WITH DRAINAGE DEVICE, PERCUTANEOUS APPROACH: ICD-10-PCS | Performed by: RADIOLOGY

## 2020-05-26 PROCEDURE — 99233 SBSQ HOSP IP/OBS HIGH 50: CPT | Performed by: HOSPITALIST

## 2020-05-26 PROCEDURE — 99152 MOD SED SAME PHYS/QHP 5/>YRS: CPT | Performed by: UROLOGY

## 2020-05-26 PROCEDURE — 99232 SBSQ HOSP IP/OBS MODERATE 35: CPT | Performed by: INTERNAL MEDICINE

## 2020-05-26 PROCEDURE — 49405 IMAGE CATH FLUID COLXN VISC: CPT | Performed by: UROLOGY

## 2020-05-26 PROCEDURE — 99232 SBSQ HOSP IP/OBS MODERATE 35: CPT | Performed by: UROLOGY

## 2020-05-26 RX ORDER — SODIUM CHLORIDE 9 MG/ML
INJECTION, SOLUTION INTRAVENOUS CONTINUOUS
Status: DISCONTINUED | OUTPATIENT
Start: 2020-05-26 | End: 2020-05-28

## 2020-05-26 RX ORDER — FLUMAZENIL 0.1 MG/ML
0.2 INJECTION, SOLUTION INTRAVENOUS AS NEEDED
Status: DISCONTINUED | OUTPATIENT
Start: 2020-05-26 | End: 2020-05-28

## 2020-05-26 RX ORDER — NALOXONE HYDROCHLORIDE 0.4 MG/ML
80 INJECTION, SOLUTION INTRAMUSCULAR; INTRAVENOUS; SUBCUTANEOUS AS NEEDED
Status: DISCONTINUED | OUTPATIENT
Start: 2020-05-26 | End: 2020-05-28

## 2020-05-26 RX ORDER — HEPARIN SODIUM 5000 [USP'U]/ML
5000 INJECTION, SOLUTION INTRAVENOUS; SUBCUTANEOUS EVERY 12 HOURS SCHEDULED
Status: DISCONTINUED | OUTPATIENT
Start: 2020-05-27 | End: 2020-05-28

## 2020-05-26 RX ORDER — MIDAZOLAM HYDROCHLORIDE 1 MG/ML
INJECTION INTRAMUSCULAR; INTRAVENOUS
Status: COMPLETED | OUTPATIENT
Start: 2020-05-26 | End: 2020-05-26

## 2020-05-26 RX ORDER — MIDAZOLAM HYDROCHLORIDE 1 MG/ML
1 INJECTION INTRAMUSCULAR; INTRAVENOUS EVERY 5 MIN PRN
Status: DISCONTINUED | OUTPATIENT
Start: 2020-05-26 | End: 2020-05-28

## 2020-05-26 RX ORDER — MIDAZOLAM HYDROCHLORIDE 1 MG/ML
INJECTION INTRAMUSCULAR; INTRAVENOUS
Status: DISPENSED
Start: 2020-05-26 | End: 2020-05-26

## 2020-05-26 NOTE — PROGRESS NOTES
[facility] HOSP - [facility]  Progress Note     Ashwin Landa  : 1970    Status: Inpatient  Day #: 9    Attending: Bonifacio Meade MD  PCP: Tucker Andrews MD      Assessment and Plan     Sepsis due to acute bilateral pyelonephritis.   Severe L emph °C)  Pulse:  [74-85] 85  Resp:  [16-22] 22  BP: (113-125)/(67-88) 113/79  General:  Alert, no distress  HEENT:  Normocephalic, atraumatic  Neck:  Supple, symmetrical  Cardiac:  Regular rate, regular rhythm  Pulmonary:  Clear to auscultation bilaterally, re excretion of the left kidney. An element of residual acute tubular necrosis is suspected. 4. Right-sided pyelonephritis persists. Continued surveillance is advised. 5. Bladder wall thickening, which could be due to underdistention or cystitis.   6. Left p

## 2020-05-26 NOTE — PLAN OF CARE
Problem: Patient Centered Care  Goal: Patient preferences are identified and integrated in the patient's plan of care  Description  Interventions:  - What would you like us to know as we care for you?  \"I have not eaten in four days\"  - Provide timely, based on type and severity of pain and evaluate response  - Implement non-pharmacological measures as appropriate and evaluate response  - Consider cultural and social influences on pain and pain management  - Manage/alleviate anxiety  - Utilize distractio status, cognitive ability or social support system  Outcome: Progressing     Problem: GASTROINTESTINAL - ADULT  Goal: Minimal or absence of nausea and vomiting  Description  INTERVENTIONS:  - Maintain adequate hydration with IV or PO as ordered and tolerat available)  - Encourage oral intake as appropriate  - Instruct patient on fluid and nutrition restrictions as appropriate  Outcome: Progressing     Patient's VSS overnight, awaiting repeat CT results and plan for day, NPO since midnoc for possible sx;  con

## 2020-05-26 NOTE — BRIEF PROCEDURE NOTE
[facility]  Procedure Note    Oralee Arms Patient Status:  Inpatient    1970 MRN [id number]   Location [facility] 5SW/SE Attending Connie Zelaya MD   [facility] Day # 9 PCP Tori Maddox MD     Procedure: CT-guided per

## 2020-05-26 NOTE — PROGRESS NOTES
[place] [facility]    Ashwin Landa Patient Status:  Inpatient    1970 MRN [id number]   Location [facility] 2W/SW Attending Dudley Pabon MD   Hosp Day # 9 PCP Tucker Andrews MD     Subjective:  JAMIA re [place]. Sx started 3 days PTA. Also with some left back pain but otherwise no fevers, chills, dysuria, hematuria, abdominal pain or any other focal symptoms.   On admission febrile to 103.1 with WBC 27.4.  UA WBC 20.  Lactic acid of 3.3 with serum bicarb

## 2020-05-26 NOTE — PLAN OF CARE
Faith Meyer is aware of POC at this time. Pt to be NPO at [facility] for possible surgery. No complaints of pain at this time. ACCU checks [facility]. Coverage provided per orders. IV abx given per orders.       Problem: Patient Centered Care  Goal: Patient pre adequate comfort level or patient's stated pain goal  Description  INTERVENTIONS:  - Encourage pt to monitor pain and request assistance  - Assess pain using appropriate pain scale  - Administer analgesics based on type and severity of pain and evaluate re their own health  - Refer to Case Management Department for coordinating discharge planning if the patient needs post-hospital services based on physician/LIP order or complex needs related to functional status, cognitive ability or social support system gravity, serum osmolarity and serum sodium as indicated or ordered  - Monitor response to interventions for patient's volume status, including labs, urine output, blood pressure (other measures as available)  - Encourage oral intake as appropriate  - Instr

## 2020-05-26 NOTE — PAYOR COMM NOTE
Tiffanie  #[id number] (48year old M)   [facility] [id number]      oB Fong MD   Physician   Urology   Progress Notes       Signed   Date of Service:  5/26/2020  9:22 AM               Signed             Show:Clear all  [x]Manual[x]Template[]Co    (L) 05/26/2020     K 4.5 05/26/2020     CL 96 (L) 05/26/2020     CO2 35.0 (H) 05/26/2020      (H) 05/26/2020     CA 8.6 05/26/2020     ALB 1.7 (L) 05/20/2020     ALKPHO 111 05/18/2020     AST 28 05/18/2020     ALT 20 05/18/2020     INR 1.14

## 2020-05-26 NOTE — PROGRESS NOTES
SITE ACCESS AND GUIDE WIRE INSERTED. SCAN TOP CONFIRM. TO HAVE 8 F DRAIN PLACED INTO FLUID COLLECTION. DILATOR USED TO AID PLACEMENT OF DRAIN. SAMPLE OBTAINED.

## 2020-05-26 NOTE — PROGRESS NOTES
ARRIVED VIA BED, POSITION PRONE.  IMAGES DONE. SCANS VERIFIED AND SITE MARKED AND PREP WITH CHLORAPREP AND 1 % LIDO. ACCESS SITE WITH 5 F 10 CM RING NEEDLE AND SCAN TO CONFIRM POSITION.

## 2020-05-26 NOTE — PLAN OF CARE
Problem: Patient Centered Care  Goal: Patient preferences are identified and integrated in the patient's plan of care  Description  Interventions:  - What would you like us to know as we care for you?  \"I have not eaten in four days\"  - Provide timely, based on type and severity of pain and evaluate response  - Implement non-pharmacological measures as appropriate and evaluate response  - Consider cultural and social influences on pain and pain management  - Manage/alleviate anxiety  - Utilize distractio status, cognitive ability or social support system  Outcome: Progressing     Problem: GASTROINTESTINAL - ADULT  Goal: Minimal or absence of nausea and vomiting  Description  INTERVENTIONS:  - Maintain adequate hydration with IV or PO as ordered and tolerat available)  - Encourage oral intake as appropriate  - Instruct patient on fluid and nutrition restrictions as appropriate  Outcome: Progressing   Patient awake and alertx4, v/s wnl, reports no pain, npo for a procedure today, IV abx tolerating well, call l

## 2020-05-26 NOTE — PROGRESS NOTES
SCAN TO CONFIRM. SECURE DRAIN WITH SUTURE. CONNECT DRAIN TO ANDER BULB SUCTION. Lorena Lopez DRESS SITE WITH BIOPATCH AND SORBAVIEW DRESSING.

## 2020-05-26 NOTE — PROGRESS NOTES
[place] [facility] - [facility]    Progress Note    Lonnie Valencia Patient Status:  Inpatient    1970 MRN [id number]   Location [facility] 5SW/SE Attending Wanda Murillo MD   [address] Day # 9 PCP Adilene Oh MD       Subjective:   Nora Estes increasing loculation of a subcapsular fluid and gas along the posterior margin of the left kidney, compatible with worsening perirenal abscess formation.   2. Severe emphysematous pyelonephritis with gas dissecting through the posteromedial left upper pole

## 2020-05-27 ENCOUNTER — APPOINTMENT (OUTPATIENT)
Dept: PICC SERVICES | Facility: HOSPITAL | Age: 50
DRG: 871 | End: 2020-05-27
Attending: PHYSICIAN ASSISTANT
Payer: MEDICAID

## 2020-05-27 PROCEDURE — 99232 SBSQ HOSP IP/OBS MODERATE 35: CPT | Performed by: UROLOGY

## 2020-05-27 PROCEDURE — 99232 SBSQ HOSP IP/OBS MODERATE 35: CPT | Performed by: INTERNAL MEDICINE

## 2020-05-27 PROCEDURE — 02HV33Z INSERTION OF INFUSION DEVICE INTO SUPERIOR VENA CAVA, PERCUTANEOUS APPROACH: ICD-10-PCS | Performed by: HOSPITALIST

## 2020-05-27 PROCEDURE — 99233 SBSQ HOSP IP/OBS HIGH 50: CPT | Performed by: HOSPITALIST

## 2020-05-27 NOTE — PROGRESS NOTES
[place] [facility] - [facility]    Progress Note    Kellee Styles Patient Status:  Inpatient    1970 MRN [id number]   Location [facility] 2W/SW Attending Melony Camacho, [address] [facility] Se Day # 10 PCP Daniel Sierra MD     Subjective  Eating Rob Friedman MD  5/27/2020

## 2020-05-27 NOTE — PROGRESS NOTES
[facility]    Progress Note    Ashwin Landa Patient Status:  Inpatient    1970 MRN [id number]   Location [facility] 5SW/SE Attending Chintan Braun, [address] [facility] Se Day # 10 PCP Tucker Andrews MD       Subjective:    Leann Batch 1.14 05/17/2020         Ct Drain Abscess Renal Perirenal (cpt=49405)    Result Date: 5/27/2020  CONCLUSION:  1.  CT-guided percutaneous drainage of perinephric abscess    Dictated by (CST): Hodan Zuluaga MD on 5/27/2020 at 8:44 AM     Finalized by (CST):

## 2020-05-27 NOTE — PROGRESS NOTES
[place] [facility] - [facility]    Progress Note    Reynaldo  Patient Status:  Inpatient    1970 MRN [id number]   Location [facility] 5SW/SE Attending Luc Beaulieu, [address] [facility] Se Day # 10 PCP Yenni Aguilar MD       Subjective:     Pt 3. There is overall impaired, but intervally improved, excretion of the left kidney. An element of residual acute tubular necrosis is suspected. 4. Right-sided pyelonephritis persists. Continued surveillance is advised.   5. Bladder wall thickening, which cefepime--> now pt back on meropenem  - urine culture and blood cultures grew Klebsiella  - f/u repeat blood cultures - NGTD  - s/p drain placement by IR on 5/26  - monitor on abx, monitor CBC.   If pt ok tomorrow, then probable dc home on IV abx and f/u wi

## 2020-05-27 NOTE — PLAN OF CARE
Problem: Patient Centered Care  Goal: Patient preferences are identified and integrated in the patient's plan of care  Description  Interventions:  - What would you like us to know as we care for you?  \"I have not eaten in four days\"  - Provide timely, based on type and severity of pain and evaluate response  - Implement non-pharmacological measures as appropriate and evaluate response  - Consider cultural and social influences on pain and pain management  - Manage/alleviate anxiety  - Utilize distractio status, cognitive ability or social support system  Outcome: Progressing     Problem: GASTROINTESTINAL - ADULT  Goal: Minimal or absence of nausea and vomiting  Description  INTERVENTIONS:  - Maintain adequate hydration with IV or PO as ordered and tolerat available)  - Encourage oral intake as appropriate  - Instruct patient on fluid and nutrition restrictions as appropriate  Outcome: Progressing  Patient awake and alert X4, v/s wnl, oleg drain to gravity, picc line placed tolerated well, report no pain, will

## 2020-05-27 NOTE — PROGRESS NOTES
[facility]    Martha Pierre Patient Status:  Inpatient    1970 MRN [id number]   Location [facility] 2W/SW Attending Cary Musa MD   Hosp Day # 10 PCP Helen Webb MD     Subjective:  JAMIA roman and doing self hydration on the drive to [facility]. Sx started 3 days PTA. Also with some left back pain but otherwise no fevers, chills, dysuria, hematuria, abdominal pain or any other focal symptoms.   On admission febrile to 103.1 with WBC 27.4.  UA WBC 2

## 2020-05-27 NOTE — CM/SW NOTE
Per nsg rounds pt will need IV abx post dc. CM lvm for [facility] [phone] at [facility] pt benefits for coverage.     1530  CM lvm for [facility] AT [place] [phone] at [facility] ins coverage for IV abx.    [phone]  Per Mateo Mandujano at [facility] pt has 100% coverage

## 2020-05-28 VITALS
HEIGHT: 66 IN | WEIGHT: 174 LBS | BODY MASS INDEX: 27.97 KG/M2 | TEMPERATURE: 98 F | RESPIRATION RATE: 18 BRPM | SYSTOLIC BLOOD PRESSURE: 111 MMHG | DIASTOLIC BLOOD PRESSURE: 59 MMHG | HEART RATE: 85 BPM | OXYGEN SATURATION: 94 %

## 2020-05-28 PROCEDURE — 99232 SBSQ HOSP IP/OBS MODERATE 35: CPT | Performed by: INTERNAL MEDICINE

## 2020-05-28 PROCEDURE — 99239 HOSP IP/OBS DSCHRG MGMT >30: CPT | Performed by: HOSPITALIST

## 2020-05-28 PROCEDURE — 99232 SBSQ HOSP IP/OBS MODERATE 35: CPT | Performed by: UROLOGY

## 2020-05-28 RX ORDER — POTASSIUM CHLORIDE 20 MEQ/1
40 TABLET, EXTENDED RELEASE ORAL ONCE
Status: COMPLETED | OUTPATIENT
Start: 2020-05-28 | End: 2020-05-28

## 2020-05-28 RX ORDER — HYDROCODONE BITARTRATE AND ACETAMINOPHEN 5; 325 MG/1; MG/1
1 TABLET ORAL EVERY 6 HOURS PRN
Qty: 10 TABLET | Refills: 0 | Status: ON HOLD | OUTPATIENT
Start: 2020-05-28 | End: 2020-07-13

## 2020-05-28 RX ORDER — GLIPIZIDE 5 MG/1
5 TABLET ORAL
Qty: 60 TABLET | Refills: 2 | Status: ON HOLD | OUTPATIENT
Start: 2020-05-28 | End: 2020-07-13

## 2020-05-28 RX ORDER — METFORMIN HYDROCHLORIDE 500 MG/1
500 TABLET, EXTENDED RELEASE ORAL 2 TIMES DAILY WITH MEALS
Qty: 60 TABLET | Refills: 2 | Status: ON HOLD | OUTPATIENT
Start: 2020-05-28 | End: 2020-07-13

## 2020-05-28 NOTE — CM/SW NOTE
CM rec'd call from [facility] with [facility] infusion [facility]. She has rec'd orders from MD for [id number] [address] [place] daily and pt 's first appointment is 5/29 at 11am in their [facility] office. Info given to pt and added to AVS. RN aware of plan.     Pt provided with Good Rx card

## 2020-05-28 NOTE — PROGRESS NOTES
Patient dc home. Understands to follow up with PCP in 1 week. Patient understands that he needs to bring norco script in to the pharmacy- aware no driving when on this medication. Patient instructed by Carolyn Badilol regarding ANDER drain.  This RN instructed markell

## 2020-05-28 NOTE — PLAN OF CARE
Problem: Patient Centered Care  Goal: Patient preferences are identified and integrated in the patient's plan of care  Description  Interventions:  - What would you like us to know as we care for you?  \"I have not eaten in four days\"  - Provide timely, based on type and severity of pain and evaluate response  - Implement non-pharmacological measures as appropriate and evaluate response  - Consider cultural and social influences on pain and pain management  - Manage/alleviate anxiety  - Utilize distractio status, cognitive ability or social support system  Outcome: Progressing     Problem: GASTROINTESTINAL - ADULT  Goal: Minimal or absence of nausea and vomiting  Description  INTERVENTIONS:  - Maintain adequate hydration with IV or PO as ordered and tolerat available)  - Encourage oral intake as appropriate  - Instruct patient on fluid and nutrition restrictions as appropriate  Outcome: Progressing   Pt alert. Rich drain in left lower back. Norco as needed. On IV antibiotic. Call light within reach.

## 2020-05-28 NOTE — DISCHARGE SUMMARY
[place] [facility] - [facility]    Discharge Summary    Crystal Logan Patient Status:  Inpatient    1970 MRN [id number]   Location [facility] 5SW/SE Attending Yuni Garza, [address] [facility] Day # 11 PCP Janie Nye MD     Date of Admiss He found himself to be very weak. He has not been able to eat very much at all, most everything solid or liquid he vomits. He has had no hematemesis. He denies any fever or chills. No cough, shortness of breath. No chest pain.   No obvious sick contac perinephric and perisplenic ascites was also likely reactive. There was mild bilateral hydroureteronephrosis without visible obstructing calculus in keeping with the ascending urinary tract infection or vesicoureteral reflux.   There was no bladder distent drain and IV antibiotics and right kidney has also had a chance to recover from pyelo on that side as well. -Further management based on followup imaging. If still signs of ongoing infection, will need open nephrectomy.   If infection resolved, may try to Tb24  Commonly known as:  GLUCOPHAGE-XR      Take 1 tablet (500 mg total) by mouth 2 (two) times daily with meals.    Quantity:  60 tablet  Refills:  2     metoprolol Tartrate 25 MG Tabs  Commonly known as:  LOPRESSOR      Take 1 tablet (25 mg total) by bianca

## 2020-05-28 NOTE — PLAN OF CARE
Problem: Patient Centered Care  Goal: Patient preferences are identified and integrated in the patient's plan of care  Description  Interventions:  - What would you like us to know as we care for you?  \"I have not eaten in four days\"  - Provide timely, appropriate pain scale  - Administer analgesics based on type and severity of pain and evaluate response  - Implement non-pharmacological measures as appropriate and evaluate response  - Consider cultural and social influences on pain and pain management based on physician/LIP order or complex needs related to functional status, cognitive ability or social support system  Outcome: Adequate for Discharge     Problem: GASTROINTESTINAL - ADULT  Goal: Minimal or absence of nausea and vomiting  Description  INT interventions for patient's volume status, including labs, urine output, blood pressure (other measures as available)  - Encourage oral intake as appropriate  - Instruct patient on fluid and nutrition restrictions as appropriate  Outcome: Adequate for Disc

## 2020-05-28 NOTE — PROGRESS NOTES
[place] [facility] - [facility]    Progress Note    Marcos Avers Patient Status:  Inpatient    1970 MRN [id number]   Location [facility] 2W/SW Attending Fanny Don, [address] [facility] Se Day # 11 PCP Clarisa Rodriguez MD     Subjective  Eating prevent short and long term complications from Diabetes.      Tato Gabriel MD  5/28/2020

## 2020-05-28 NOTE — PROGRESS NOTES
[place] [facility]    Reyna Cardenas Patient Status:  Inpatient    1970 MRN [id number]   Location [facility] 2W/SW Attending Chester Briseno MD   Hosp Day # 11 PCP Juana Petersen MD     Subjective:  JAMIA roman doing self hydration on the drive to [facility]. Sx started 3 days PTA. Also with some left back pain but otherwise no fevers, chills, dysuria, hematuria, abdominal pain or any other focal symptoms. On admission febrile to 103.1 with WBC 27.4.  UA WBC 20.

## 2020-05-28 NOTE — PROGRESS NOTES
[place] [facility] - [facility]    Progress Note    Marsha Mejia Patient Status:  Inpatient    1970 MRN [id number]   Location [facility] 5SW/SE Attending Amaury Daniel, [address] [facility] [address] Day # 11 PCP Isidro Saunders MD       Subjective:    Jackeline Yusuf pain, fever or chills as outpatient, he is to return to the ER right away.  -Ok to send home this afternoon or AM 5/29 if ok with Endocrine, ID and hospitalists. Results:     Lab Results   Component Value Date    WBC 17.7 (H) 05/28/2020    HGB 10.

## 2020-05-29 ENCOUNTER — TELEPHONE (OUTPATIENT)
Dept: INTERNAL MEDICINE CLINIC | Facility: CLINIC | Age: 50
End: 2020-05-29

## 2020-05-29 NOTE — PAYOR COMM NOTE
--------------  DISCHARGE REVIEW    Payor: Saqib Meade #:  [id number]  Authorization Number: [id number]    Admit date: 5/17/20  Admit time:  8763  Discharge Date: 5/28/2020  4:04 PM     Admitting Physician: Codey Harry MD  Attending Physician:  Emeka Lewis Acute pyelonephritis     Klebsiella pneumoniae sepsis (HCC)     Fever     Perirenal abscess      Physical Exam:      /59 (BP Location: Left arm)   Pulse 85   Temp 97.9 °F (36.6 °C) (Oral)   Resp 18   Ht 5' 6\" (1.676 m)   Wt 174 lb (78.9 kg)   SpO2 9 the abdomen and pelvis revealed markedly abnormal appearance of the left kidney with multifocal left renal striated nephrogram.  Additionally, there was a focal striated nephrogram involving the inter polar region of the right kidney.   Findings are most co meropenem. Pt to go home on Invanz per ID.    PICC line placed 5/27/2020.  - urine culture and blood cultures grew Klebsiella  - f/u repeat blood cultures - NGTD  - s/p drain placement by IR on 5/26  Pt to DC on Invanz 1 g daily x 4 week course with repeat endocrinology, ID, urology     Discharge Condition:  Good    Discharge Medications:      Discharge Medications      START taking these medications      Instructions Prescription details   glipiZIDE 5 MG Tabs  Commonly known as:  GLUCOTROL      Take 1 table Discharge Diagnoses: Sepsis due to bilateral pyelonephritis    Lace+ Score: 42  59-90 High Risk  29-58 Medium Risk  0-28   Low Risk. TCM Follow-Up Recommendation:  LACE 29-58:  Moderate Risk of readmission after discharge from the hospital.      >35 alen

## 2020-05-29 NOTE — TELEPHONE ENCOUNTER
Paged as on call regarding \"need dr to send script for needles\". Verbal given to provide appropriate needles for his insulin to pharmacist Lucille.

## 2020-05-31 ENCOUNTER — TELEPHONE (OUTPATIENT)
Dept: MEDSURG UNIT | Facility: HOSPITAL | Age: 50
End: 2020-05-31

## 2020-06-01 ENCOUNTER — TELEPHONE (OUTPATIENT)
Dept: MEDSURG UNIT | Facility: HOSPITAL | Age: 50
End: 2020-06-01

## 2020-06-04 ENCOUNTER — TELEPHONE (OUTPATIENT)
Dept: SURGERY | Facility: CLINIC | Age: 50
End: 2020-06-04

## 2020-06-04 NOTE — TELEPHONE ENCOUNTER
Pt was seen by MD in hospital, has [employer] insurance which is out of network.  Pt was told to do a 10 follow up pt requesting appt please advise

## 2020-06-05 NOTE — TELEPHONE ENCOUNTER
RN called patient back in response to his request for an appointment.  S/P sepsis due to acute bilateral pyelonephritis, severe Left emphysematous pyelonephritis with abscess,  Klebsiella bacteremia on 5/17 and  S/p left kidney abscess drain placement by IR

## 2020-06-14 ENCOUNTER — HOSPITAL ENCOUNTER (EMERGENCY)
Facility: HOSPITAL | Age: 50
Discharge: HOME | End: 2020-06-15
Attending: EMERGENCY MEDICINE
Payer: MEDICAID

## 2020-06-14 DIAGNOSIS — N15.1 ABSCESS OF LEFT KIDNEY: Primary | ICD-10-CM

## 2020-06-14 PROCEDURE — 96374 THER/PROPH/DIAG INJ IV PUSH: CPT

## 2020-06-14 PROCEDURE — 99284 EMERGENCY DEPT VISIT MOD MDM: CPT

## 2020-06-14 RX ORDER — MORPHINE SULFATE 4 MG/ML
4 INJECTION, SOLUTION INTRAMUSCULAR; INTRAVENOUS ONCE
Status: COMPLETED | OUTPATIENT
Start: 2020-06-15 | End: 2020-06-15

## 2020-06-15 ENCOUNTER — APPOINTMENT (OUTPATIENT)
Dept: CT IMAGING | Facility: HOSPITAL | Age: 50
End: 2020-06-15
Attending: EMERGENCY MEDICINE
Payer: MEDICAID

## 2020-06-15 VITALS
HEART RATE: 68 BPM | OXYGEN SATURATION: 97 % | TEMPERATURE: 97 F | BODY MASS INDEX: 28 KG/M2 | WEIGHT: 172 LBS | RESPIRATION RATE: 16 BRPM | DIASTOLIC BLOOD PRESSURE: 83 MMHG | SYSTOLIC BLOOD PRESSURE: 114 MMHG

## 2020-06-15 PROCEDURE — 74177 CT ABD & PELVIS W/CONTRAST: CPT | Performed by: EMERGENCY MEDICINE

## 2020-06-15 PROCEDURE — 80048 BASIC METABOLIC PNL TOTAL CA: CPT | Performed by: EMERGENCY MEDICINE

## 2020-06-15 PROCEDURE — 85025 COMPLETE CBC W/AUTO DIFF WBC: CPT | Performed by: EMERGENCY MEDICINE

## 2020-06-15 RX ORDER — HYDROCODONE BITARTRATE AND ACETAMINOPHEN 5; 325 MG/1; MG/1
1-2 TABLET ORAL EVERY 6 HOURS PRN
Qty: 10 TABLET | Refills: 0 | Status: SHIPPED | OUTPATIENT
Start: 2020-06-15 | End: 2020-06-22

## 2020-06-15 NOTE — ED INITIAL ASSESSMENT (HPI)
Pt her recently and DC w lt sided nephrostomy tube r/t pyleo and arrives c/o pain at the sight. Has not had a chance to follow up w nephrologist. currently receiving abx through [facility]. Drainage in drain purulent and clear.

## 2020-06-15 NOTE — ED PROVIDER NOTES
Patient Seen in: [facility] AND [facility] Emergency Department      History   Patient presents with:  Pain    Stated Complaint: pain     HPI    77-year-old male presents the ER with complaint of left flank pain. Patient with a past medical history of diabetes. 78 kg   SpO2 97%   BMI 27.76 kg/m²         Physical Exam  Vitals signs and nursing note reviewed. Constitutional:       Appearance: Normal appearance. He is normal weight. HENT:      Head: Normocephalic and atraumatic.       Nose: Nose normal.   Eyes: Abnormality         Status                     ---------                               -----------         ------                     CBC W/ DIFFERENTIAL[[id number]]          Abnormal            Final result                 Please view results for these hours as needed for Pain. Qty: 10 tablet Refills: 0    !! - Potential duplicate medications found. Please discuss with provider.

## 2020-06-15 NOTE — CM/SW NOTE
Called by Dr. Elisha Tay to provide patient with list of in-network urologists.   Per Dr. Elisha Tay patient has an abscess on left kidney and was admitted here 2 weeks ago d/t the above as patient was having hydronephrosis secondary to abscess, at that time a ne

## 2020-06-18 ENCOUNTER — TELEPHONE (OUTPATIENT)
Dept: SURGERY | Facility: CLINIC | Age: 50
End: 2020-06-18

## 2020-06-18 NOTE — TELEPHONE ENCOUNTER
Noted. Telephone visit slotted for Monday,6/22, at 09:20 am. (today is Thursday) Phoned pt twice, and twice received his an unidentified voice mail. Lmtcb. Clinic call back number provided.  EARNESTINE, IF PT RETURNS CALL, PLEASE inform of virtual telephone appt a

## 2020-06-18 NOTE — TELEPHONE ENCOUNTER
Staff I was reviewing this patient's chart. He was discharged from the hospital in May 2020 with left emphysematous pyelonephritis and abscesses. Had a percutaneous drain placed by interventional radiology and is on long-term intravenous antibiotics.   An

## 2020-06-19 ENCOUNTER — TELEPHONE (OUTPATIENT)
Dept: INTERVENTIONAL RADIOLOGY/VASCULAR | Facility: HOSPITAL | Age: 50
End: 2020-06-19

## 2020-06-19 NOTE — TELEPHONE ENCOUNTER
Attempted to call patient for an appointment to have his drain catheter removed. Left a message for patient to call IR for appointment.

## 2020-06-19 NOTE — TELEPHONE ENCOUNTER
Phoned pt again, d/t no apparent returned call. Lmtcb. EARNESTINE,IF PT calls back. See my message below and inform pt. Thank you.

## 2020-06-22 ENCOUNTER — TELEPHONE (OUTPATIENT)
Dept: SURGERY | Facility: CLINIC | Age: 50
End: 2020-06-22

## 2020-06-22 NOTE — TELEPHONE ENCOUNTER
LMTCB. When patient calls back, please transfer to [address] [place] [address] staff [phone].

## 2020-06-22 NOTE — TELEPHONE ENCOUNTER
Staff I have reviewed the patient's repeat CT scan from last week. I also discussed the CT and reviewed it with Dr. Angella Matos from interventional radiology as well as Dr. Mag Jung from infectious disease.     There is emphysematous changes which were present on

## 2020-06-26 DIAGNOSIS — N12 EMPHYSEMATOUS PYELONEPHRITIS OF LEFT KIDNEY: Primary | ICD-10-CM

## 2020-07-09 ENCOUNTER — APPOINTMENT (OUTPATIENT)
Dept: CT IMAGING | Facility: HOSPITAL | Age: 50
DRG: 690 | End: 2020-07-09
Attending: EMERGENCY MEDICINE
Payer: MEDICAID

## 2020-07-09 ENCOUNTER — HOSPITAL ENCOUNTER (INPATIENT)
Facility: HOSPITAL | Age: 50
LOS: 5 days | Discharge: HOME | DRG: 690 | End: 2020-07-14
Attending: EMERGENCY MEDICINE | Admitting: HOSPITALIST
Payer: MEDICAID

## 2020-07-09 DIAGNOSIS — N15.1 RENAL ABSCESS, LEFT: Primary | ICD-10-CM

## 2020-07-09 LAB
ANION GAP SERPL CALC-SCNC: 5 MMOL/L (ref 0–18)
BASOPHILS # BLD AUTO: 0.11 X10(3) UL (ref 0–0.2)
BASOPHILS NFR BLD AUTO: 1 %
BILIRUB UR QL: NEGATIVE
BUN BLD-MCNC: 14 MG/DL (ref 7–18)
BUN/CREAT SERPL: 15.4 (ref 10–20)
CALCIUM BLD-MCNC: 9.8 MG/DL (ref 8.5–10.1)
CHLORIDE SERPL-SCNC: 106 MMOL/L (ref 98–112)
CO2 SERPL-SCNC: 28 MMOL/L (ref 21–32)
COLOR UR: YELLOW
CREAT BLD-MCNC: 0.91 MG/DL (ref 0.7–1.3)
DEPRECATED RDW RBC AUTO: 49.5 FL (ref 35.1–46.3)
EOSINOPHIL # BLD AUTO: 0.36 X10(3) UL (ref 0–0.7)
EOSINOPHIL NFR BLD AUTO: 3.3 %
ERYTHROCYTE [DISTWIDTH] IN BLOOD BY AUTOMATED COUNT: 15.9 % (ref 11–15)
GLUCOSE BLD-MCNC: 102 MG/DL (ref 70–99)
GLUCOSE BLDC GLUCOMTR-MCNC: 123 MG/DL (ref 70–99)
GLUCOSE UR-MCNC: NEGATIVE MG/DL
HCT VFR BLD AUTO: 38.2 % (ref 39–53)
HGB BLD-MCNC: 12.7 G/DL (ref 13–17.5)
IMM GRANULOCYTES # BLD AUTO: 0.05 X10(3) UL (ref 0–1)
IMM GRANULOCYTES NFR BLD: 0.5 %
KETONES UR-MCNC: NEGATIVE MG/DL
LYMPHOCYTES # BLD AUTO: 4.67 X10(3) UL (ref 1–4)
LYMPHOCYTES NFR BLD AUTO: 42.9 %
MCH RBC QN AUTO: 28.5 PG (ref 26–34)
MCHC RBC AUTO-ENTMCNC: 33.2 G/DL (ref 31–37)
MCV RBC AUTO: 85.8 FL (ref 80–100)
MONOCYTES # BLD AUTO: 0.73 X10(3) UL (ref 0.1–1)
MONOCYTES NFR BLD AUTO: 6.7 %
NEUTROPHILS # BLD AUTO: 4.97 X10 (3) UL (ref 1.5–7.7)
NEUTROPHILS # BLD AUTO: 4.97 X10(3) UL (ref 1.5–7.7)
NEUTROPHILS NFR BLD AUTO: 45.6 %
NITRITE UR QL STRIP.AUTO: NEGATIVE
OSMOLALITY SERPL CALC.SUM OF ELEC: 289 MOSM/KG (ref 275–295)
PH UR: 5 [PH] (ref 5–8)
PLATELET # BLD AUTO: 379 10(3)UL (ref 150–450)
POTASSIUM SERPL-SCNC: 4.1 MMOL/L (ref 3.5–5.1)
PROT UR-MCNC: 30 MG/DL
RBC # BLD AUTO: 4.45 X10(6)UL (ref 4.3–5.7)
RBC #/AREA URNS AUTO: 65 /HPF
SARS-COV-2 RNA RESP QL NAA+PROBE: NOT DETECTED
SODIUM SERPL-SCNC: 139 MMOL/L (ref 136–145)
SP GR UR STRIP: 1.04 (ref 1–1.03)
UROBILINOGEN UR STRIP-ACNC: <2
WBC # BLD AUTO: 10.9 X10(3) UL (ref 4–11)
WBC #/AREA URNS AUTO: 288 /HPF

## 2020-07-09 PROCEDURE — 74177 CT ABD & PELVIS W/CONTRAST: CPT | Performed by: EMERGENCY MEDICINE

## 2020-07-09 RX ORDER — SODIUM CHLORIDE 9 MG/ML
INJECTION, SOLUTION INTRAVENOUS CONTINUOUS
Status: DISCONTINUED | OUTPATIENT
Start: 2020-07-09 | End: 2020-07-11

## 2020-07-09 RX ORDER — DEXTROSE MONOHYDRATE 25 G/50ML
50 INJECTION, SOLUTION INTRAVENOUS
Status: DISCONTINUED | OUTPATIENT
Start: 2020-07-09 | End: 2020-07-14

## 2020-07-09 RX ORDER — VANCOMYCIN 2 GRAM/500 ML IN 0.9 % SODIUM CHLORIDE INTRAVENOUS
25 ONCE
Status: COMPLETED | OUTPATIENT
Start: 2020-07-09 | End: 2020-07-09

## 2020-07-09 NOTE — ED INITIAL ASSESSMENT (HPI)
Pt sent her for an abnormal ct scan on the kidney. Results show an abscess.  Pt denies any symptoms at this time was advised to come in by his pmd

## 2020-07-10 ENCOUNTER — APPOINTMENT (OUTPATIENT)
Dept: GENERAL RADIOLOGY | Facility: HOSPITAL | Age: 50
DRG: 690 | End: 2020-07-10
Attending: HOSPITALIST
Payer: MEDICAID

## 2020-07-10 ENCOUNTER — APPOINTMENT (OUTPATIENT)
Dept: CT IMAGING | Facility: HOSPITAL | Age: 50
DRG: 690 | End: 2020-07-10
Attending: UROLOGY
Payer: MEDICAID

## 2020-07-10 LAB
EST. AVERAGE GLUCOSE BLD GHB EST-MCNC: 192 MG/DL (ref 68–126)
GLUCOSE BLDC GLUCOMTR-MCNC: 100 MG/DL (ref 70–99)
GLUCOSE BLDC GLUCOMTR-MCNC: 113 MG/DL (ref 70–99)
GLUCOSE BLDC GLUCOMTR-MCNC: 186 MG/DL (ref 70–99)
GLUCOSE BLDC GLUCOMTR-MCNC: 95 MG/DL (ref 70–99)
HBA1C MFR BLD HPLC: 8.3 % (ref ?–5.7)

## 2020-07-10 PROCEDURE — 10010 FNA BX W/CT GDN EA ADDL: CPT | Performed by: UROLOGY

## 2020-07-10 PROCEDURE — 99252 IP/OBS CONSLTJ NEW/EST SF 35: CPT | Performed by: UROLOGY

## 2020-07-10 PROCEDURE — 49405 IMAGE CATH FLUID COLXN VISC: CPT | Performed by: UROLOGY

## 2020-07-10 PROCEDURE — 0T9130Z DRAINAGE OF LEFT KIDNEY WITH DRAINAGE DEVICE, PERCUTANEOUS APPROACH: ICD-10-PCS | Performed by: RADIOLOGY

## 2020-07-10 PROCEDURE — 99152 MOD SED SAME PHYS/QHP 5/>YRS: CPT | Performed by: UROLOGY

## 2020-07-10 PROCEDURE — 71045 X-RAY EXAM CHEST 1 VIEW: CPT | Performed by: HOSPITALIST

## 2020-07-10 RX ORDER — MIDAZOLAM HYDROCHLORIDE 1 MG/ML
1 INJECTION INTRAMUSCULAR; INTRAVENOUS EVERY 5 MIN PRN
Status: DISCONTINUED | OUTPATIENT
Start: 2020-07-10 | End: 2020-07-13

## 2020-07-10 RX ORDER — FLUMAZENIL 0.1 MG/ML
0.2 INJECTION, SOLUTION INTRAVENOUS AS NEEDED
Status: DISCONTINUED | OUTPATIENT
Start: 2020-07-10 | End: 2020-07-14

## 2020-07-10 RX ORDER — VANCOMYCIN HYDROCHLORIDE
15 EVERY 12 HOURS
Status: DISCONTINUED | OUTPATIENT
Start: 2020-07-10 | End: 2020-07-13

## 2020-07-10 RX ORDER — SODIUM CHLORIDE 9 MG/ML
INJECTION, SOLUTION INTRAVENOUS CONTINUOUS
Status: DISCONTINUED | OUTPATIENT
Start: 2020-07-10 | End: 2020-07-14

## 2020-07-10 RX ORDER — MIDAZOLAM HYDROCHLORIDE 1 MG/ML
INJECTION INTRAMUSCULAR; INTRAVENOUS
Status: COMPLETED | OUTPATIENT
Start: 2020-07-10 | End: 2020-07-10

## 2020-07-10 RX ORDER — MIDAZOLAM HYDROCHLORIDE 1 MG/ML
INJECTION INTRAMUSCULAR; INTRAVENOUS
Status: DISPENSED
Start: 2020-07-10 | End: 2020-07-11

## 2020-07-10 RX ORDER — NALOXONE HYDROCHLORIDE 0.4 MG/ML
80 INJECTION, SOLUTION INTRAMUSCULAR; INTRAVENOUS; SUBCUTANEOUS AS NEEDED
Status: DISCONTINUED | OUTPATIENT
Start: 2020-07-10 | End: 2020-07-13

## 2020-07-10 NOTE — ED PROVIDER NOTES
Patient Seen in: [facility] Emergency Department    History   Patient presents with:  Abnormal Result      HPI    Patient presents to the ED after having a CT scan at [facility] today showing ongoing abscesses around his left kidney.   He states t on file      Number of children: Not on file      Years of education: Not on file      Highest education level: Not on file    Tobacco Use      Smoking status: Current Every Day Smoker        Packs/day: 1.00        Years: 30.00        Pack years: 27 He exhibits no distension. There is no tenderness. There is no rebound and no guarding. Musculoskeletal:         General: No deformity or edema. Neurological: He is alert and oriented to person, place, and time. Skin: Skin is warm and dry.    Psychiat GOLD   BLOOD CULTURE   BLOOD CULTURE   URINE CULTURE, ROUTINE         Imaging Results Available and Reviewed while in ED: Ct Abdomen Pelvis Iv Contrast, No Oral (er)    Result Date: 7/9/2020  CONCLUSION:  1.  History of left emphysematous pyelonephritis wit (NOVOLOG) 100 UNIT/ML flexpen 1-5 Units (has no administration in time range)   metoprolol Tartrate (LOPRESSOR) tab 25 mg (25 mg Oral Given 7/9/20 1611)   insulin detemir (LEVEMIR) 100 UNIT/ML flextouch 16 Units (has no administration in time range)   vanc abscess, left  (primary encounter diagnosis)    Disposition:  Admit    Follow-up:  No follow-up provider specified.     Medications Prescribed:  Current Discharge Medication List        Present on Admission  Date Reviewed: 5/25/2020          ICD-10-CM Noted

## 2020-07-10 NOTE — PROGRESS NOTES
1558 Patient arrival to CT room. Spoke with Dr. Jackie Sampson. Consent signed and reviewed. Prone on table. Monitor applied, VSS. PICC line flushed and patent. Scouts taken. 1614 Time out taken    1615 Sedation medication administered.  Refer to [facility]    1615 chlor

## 2020-07-10 NOTE — PLAN OF CARE
Problem: Diabetes/Glucose Control  Goal: Glucose maintained within prescribed range  Description  INTERVENTIONS:  - Monitor Blood Glucose as ordered  - Assess for signs and symptoms of hyperglycemia and hypoglycemia  - Administer ordered medications to m Consider cultural and social influences on pain and pain management  - Manage/alleviate anxiety  - Utilize distraction and/or relaxation techniques  - Monitor for opioid side effects  - Notify MD/LIP if interventions unsuccessful or patient reports new chelle maintain glucose within target range  - Assess barriers to adequate nutritional intake and initiate nutrition consult as needed  - Instruct patient on self management of diabetes  Outcome: Progressing     Problem: SKIN/TISSUE INTEGRITY - ADULT  Goal: Skin

## 2020-07-10 NOTE — PLAN OF CARE
Problem: Diabetes/Glucose Control  Goal: Glucose maintained within prescribed range  Description  INTERVENTIONS:  - Monitor Blood Glucose as ordered  - Assess for signs and symptoms of hyperglycemia and hypoglycemia  - Administer ordered medications to m Consider cultural and social influences on pain and pain management  - Manage/alleviate anxiety  - Utilize distraction and/or relaxation techniques  - Monitor for opioid side effects  - Notify MD/LIP if interventions unsuccessful or patient reports new chelle ordered  - Monitor response to electrolyte replacements, including rhythm and repeat lab results as appropriate  - Fluid restriction as ordered  - Instruct patient on fluid and nutrition restrictions as appropriate  Outcome: Progressing     Problem: SKIN/T

## 2020-07-10 NOTE — CONSULTS
[place] [facility] - [facility] ID CONSULT NOTE    Srinivasa De Los Santos Patient Status:  Inpatient    1970 MRN [id number]   Location [facility] 5SW/SE Attending Leda Leyden, MD   Hosp Day # 1 PCP Michelle Hawkins MD       Reason for Cons glucose (DEX4) oral liquid 15 g, 15 g, Oral, Q15 Min PRN **OR** Glucose-Vitamin C (DEX-4) chewable tab 4 tablet, 4 tablet, Oral, Q15 Min PRN **OR** dextrose 50 % injection 50 mL, 50 mL, Intravenous, Q15 Min PRN **OR** glucose (DEX4) oral liquid 30 g, 30 g, Musculoskeletal: No edema noted  Integument: No lesions. No erythema.   Lines: PICC+    Laboratory Data:  Recent Labs   Lab 07/09/20 2023   RBC 4.45   HGB 12.7*   HCT 38.2*   MCV 85.8   MCH 28.5   MCHC 33.2   RDW 15.9*   NEPRELIM 4.97   WBC 10.9   PLT 37 imaging reports, available old records. -  Case d/w patient, RN. Thank you for allowing us to participate in the care of this patient. Please do not hesitate to call if you have any questions.    We will continue to follow with you and will make further

## 2020-07-10 NOTE — PROGRESS NOTES
Instilled 2mg of activase into right arm picc line. 1430: Attempted to check blood  return on picc line after activase instillation. No blood return noted. 1530:  Attempted to check blood return on picc line after activase installation and blood ret

## 2020-07-10 NOTE — BRIEF PROCEDURE NOTE
[facility]  Procedure Note    Tarlaura Valencia Patient Status:  Inpatient    1970 MRN [id number]   [facility]SE Attending Garnet Moritz, MD   Hosp Day # 1 PCP Adilene Oh MD     Procedure: CT-guided p

## 2020-07-10 NOTE — PROGRESS NOTES
[facility] Dosing Service    Initial Pharmacokinetic Consult for Vancomycin Dosing     Oralee Marguerite is a 48year old patient who is being treated for RENAL ABSCESS.   Pharmacy has been asked to dose Vancomycin by Dr. Kristal Barraza    Patient has no known aller

## 2020-07-10 NOTE — PAYOR COMM NOTE
--------------  ADMISSION REVIEW     Payor: [employer] [address] #:  [id number]  Authorization Number: [id number]    Admit date: 7/9/20  Admit time: 2257       Admitting Physician: Antonella Hayes MD  Attending Physician:  Antonella Hayes MD  Primary Care Physicia Oral Tablet 24 Hr, Take 1 tablet (500 mg total) by mouth 2 (two) times daily with meals. , Disp: 60 tablet, Rfl: 2, 7/9/2020 at Unknown time  metoprolol Tartrate 25 MG Oral Tab, Take 1 tablet (25 mg total) by mouth 2x Daily(Beta Blocker). , Disp: 60 tablet, examination was cleaned with super sani-cloth germicidal wipes following the exam.     Physical Exam   Constitutional: He is oriented to person, place, and time. He appears well-developed and well-nourished. No distress.    HENT:   Head: Normocephalic and a DIFFERENTIAL WITH PLATELET    Narrative: The following orders were created for panel order CBC WITH DIFFERENTIAL WITH PLATELET.                   Procedure                               Abnormality         Status                                     ---- Finalized by (CST): Cj Mena MD on 7/09/2020 at 10:11 PM          ED Medications Administered:   Medications   glucose (DEX4) oral liquid 15 g (has no administration in time range)     Or   Glucose-Vitamin C (DEX-4) chewable tab 4 tablet (has no patient already has does not have any pertinent problems on file. to contribute to the complexity of this ED evaluation. ED Course: Patient returns to the ED for an abnormal outpatient CT. Laboratory testing with trace leukocytosis.   Evidence for urina II  -HgbA1C pending  -home regimen: insulin 32 units nightly, metformin, glipizide   -levemir 16 units plus SSI prn, adjust as needed  -accuchecks  -was supposed to follow up with endocrine- Dr Muna Lance   -reinforced smoking cessation Allergies     Home Medications:  HYDROcodone-acetaminophen 5-325 MG Oral Tab, Take 1 tablet by mouth every 6 (six) hours as needed. , Disp: 10 tablet, Rfl: 0  insulin detemir 100 UNIT/ML Subcutaneous Solution Pen-injector, Inject 32 Units into the skin nig previously noted subcapsular posterior left perinephric pigtail drain has been intervally removed. Adjacent left posterior subcapsular renal abscess appears significantly decreased in size in the interim.   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 He states his appetite is been normal.  His weight has been stable. He denies any dysuria or gross hematuria. During this admission urinalysis shows large white cells and blood cells.   Urine culture is pendi perinephric fluid collection. Jorge Oats abnormal appearance of the left kidney   with extensive left perinephric stranding, multifocal striations of left renal nephrogram, and multiple left renal abscesses.  A dominant superomedial renal abscess now measur hernias.     =====  CONCLUSION:   1. History of left emphysematous pyelonephritis with multifocal left renal abscesses.  A previously noted subcapsular posterior left perinephric pigtail drain has been intervally removed.  Adjacent left posterior subcapsul scan.  Compared with CT scan performed here Shannan 15, 2020 there continues to be improvement on outpatient intravenous antibiotics.   There is a possibility that the patient may ultimately still require his left kidney removed but given his clinical course a

## 2020-07-10 NOTE — H&P
[facility] Hospitalist Team  History and Physical  Admit Date:  7/9/20    ASSESSMENT / PLAN:   47 yo male with DM type II, PSVT, tobacco abuse and emphysematous pyelonephritis with perirenal abscess left kidney who has been on outpt IV abx with previous left drai BMI 29.76 kg/m²     GENERAL: no apparent distress, overweight  NEUROLOGIC: A/A;  Ox3  SKIN: no rashes  HEENT: normocephalic, normal nose, pharynx and TM's, sclera anicteric, conjunctiva normal  NECK: supple  RESPIRATORY: normal expansion; non labored, CTA 07/09/20 2023   WBC 10.9   HGB 12.7*   MCV 85.8   .0       Recent Labs   Lab 07/09/20 2023      K 4.1      CO2 28.0   BUN 14   CREATSERUM 0.91   *   CA 9.8       No results for input(s): ALT, AST, ALB, AMYLASE, LIPASE, LDH in th Heart with regular rate and rhythm, no peripheral edema  Abd: Abdomen soft, nontender, nondistended, no organomegaly, bowel sounds present  MSK: Full range of motion in extremities, no clubbing, no cyanosis  Skin: no rashes or lesions  Neuro: AO*3, motor i

## 2020-07-10 NOTE — ED NOTES
Orders for admission, patient is aware of plan and ready to go upstairs. Any questions, please call ED ОЛЕГ Vargas  at extension [phone]. Pt is a/o x 4. Ambulates independently with steady gait. Pt has PICC line that flushes but does not draw.

## 2020-07-10 NOTE — CONSULTS
[facility]    Report of Consultation    Danny Osborne Patient Status:  Inpatient    1970 MRN [id number]   Location [facility] 5SW/SE Attending Sonal Garcia MD   Hosp Day # 1 PCP Odell Dewitt MD     Date of Admissio asymptomatic. He is a poorly controlled diabetic essentially noncompliant with care and appears to be doing better now.   States he is been giving himself insulin and according to the patient's report his glucose at home is been running around the 120 rang splenic granulomas. ADRENALS:      No defined mass or abnormal enlargement. KIDNEYS:          Presumed excreted contrast within the renal collecting systems from recent prior abdominal CT.   Right kidney appears grossly unremarkable without hydronephros retroperitoneal lymph nodes appear similar in comparison to prior. BONES:             Scattered sclerotic foci likely reflect bone islands. ABDOMINAL WALL:      Tiny fat containing umbilical hernia.   OTHER:             There are small fat containing ingu Current Medications:  vancomycin IVPB premix 1.25g in 0.9% NaCl 250 mL, 15 mg/kg, Intravenous, Q12H  Cefepime HCl (MAXIPIME) 1 g in sodium chloride 0.9% 100 mL MBP/add-vantage, 1 g, Intravenous, Q8H  glucose (DEX4) oral liquid 15 g, 15 g, Oral, Q15 Min Data:  Lab Results   Component Value Date    WBC 10.9 07/09/2020    HGB 12.7 (L) 07/09/2020    HCT 38.2 (L) 07/09/2020    .0 07/09/2020    CREATSERUM 0.91 07/09/2020    BUN 14 07/09/2020     07/09/2020    K 4.1 07/09/2020     07/09/2020 5. Lesser incidental findings as above.    Dictated by (CST): Sai Galicia MD on 7/09/2020 at 9:57 PM     Finalized by (CST): Sai Galicia MD on 7/09/2020 at 10:11 PM               Impression:     Renal abscess, left  Reviewed repeat imaging performed

## 2020-07-10 NOTE — CM/SW NOTE
CM contacted by Bebo Marques at [facility] anticipated continued LT IV Abx needs. Insurance covered 100% for current outpt tx with Eleanor Lopezs in office infusions and pt was going to [facility] location.   Per [employer] communication w/ ID the Eleanor Golds may need to be m

## 2020-07-10 NOTE — ED NOTES
Pt  2 months ago was hospitalized for kidney infection.  has been on daily IV antibiotics per PICC line.   had a CT scan done today, in [place], and was advised to come to ER for further eval.  did have a drain in place that fell

## 2020-07-11 LAB
ANION GAP SERPL CALC-SCNC: 3 MMOL/L (ref 0–18)
BASOPHILS # BLD AUTO: 0.06 X10(3) UL (ref 0–0.2)
BASOPHILS NFR BLD AUTO: 0.6 %
BUN BLD-MCNC: 11 MG/DL (ref 7–18)
BUN/CREAT SERPL: 13.9 (ref 10–20)
CALCIUM BLD-MCNC: 8.9 MG/DL (ref 8.5–10.1)
CHLORIDE SERPL-SCNC: 108 MMOL/L (ref 98–112)
CO2 SERPL-SCNC: 29 MMOL/L (ref 21–32)
CREAT BLD-MCNC: 0.79 MG/DL (ref 0.7–1.3)
DEPRECATED RDW RBC AUTO: 49.9 FL (ref 35.1–46.3)
EOSINOPHIL # BLD AUTO: 0.24 X10(3) UL (ref 0–0.7)
EOSINOPHIL NFR BLD AUTO: 2.4 %
ERYTHROCYTE [DISTWIDTH] IN BLOOD BY AUTOMATED COUNT: 16.1 % (ref 11–15)
GLUCOSE BLD-MCNC: 84 MG/DL (ref 70–99)
GLUCOSE BLDC GLUCOMTR-MCNC: 130 MG/DL (ref 70–99)
GLUCOSE BLDC GLUCOMTR-MCNC: 170 MG/DL (ref 70–99)
GLUCOSE BLDC GLUCOMTR-MCNC: 195 MG/DL (ref 70–99)
GLUCOSE BLDC GLUCOMTR-MCNC: 96 MG/DL (ref 70–99)
HCT VFR BLD AUTO: 31.8 % (ref 39–53)
HGB BLD-MCNC: 10.6 G/DL (ref 13–17.5)
IMM GRANULOCYTES # BLD AUTO: 0.03 X10(3) UL (ref 0–1)
IMM GRANULOCYTES NFR BLD: 0.3 %
LYMPHOCYTES # BLD AUTO: 2.93 X10(3) UL (ref 1–4)
LYMPHOCYTES NFR BLD AUTO: 29.7 %
MCH RBC QN AUTO: 28.7 PG (ref 26–34)
MCHC RBC AUTO-ENTMCNC: 33.3 G/DL (ref 31–37)
MCV RBC AUTO: 86.2 FL (ref 80–100)
MONOCYTES # BLD AUTO: 0.74 X10(3) UL (ref 0.1–1)
MONOCYTES NFR BLD AUTO: 7.5 %
NEUTROPHILS # BLD AUTO: 5.85 X10 (3) UL (ref 1.5–7.7)
NEUTROPHILS # BLD AUTO: 5.85 X10(3) UL (ref 1.5–7.7)
NEUTROPHILS NFR BLD AUTO: 59.5 %
OSMOLALITY SERPL CALC.SUM OF ELEC: 289 MOSM/KG (ref 275–295)
PLATELET # BLD AUTO: 288 10(3)UL (ref 150–450)
POTASSIUM SERPL-SCNC: 3.8 MMOL/L (ref 3.5–5.1)
RBC # BLD AUTO: 3.69 X10(6)UL (ref 4.3–5.7)
SODIUM SERPL-SCNC: 140 MMOL/L (ref 136–145)
VANCOMYCIN TROUGH SERPL-MCNC: 12.8 UG/ML (ref 10–20)
WBC # BLD AUTO: 9.9 X10(3) UL (ref 4–11)

## 2020-07-11 RX ORDER — ACETAMINOPHEN 325 MG/1
650 TABLET ORAL EVERY 6 HOURS PRN
Status: DISCONTINUED | OUTPATIENT
Start: 2020-07-11 | End: 2020-07-13

## 2020-07-11 RX ORDER — HEPARIN SODIUM 5000 [USP'U]/ML
5000 INJECTION, SOLUTION INTRAVENOUS; SUBCUTANEOUS EVERY 8 HOURS SCHEDULED
Status: DISCONTINUED | OUTPATIENT
Start: 2020-07-11 | End: 2020-07-14

## 2020-07-11 RX ORDER — HYDROCODONE BITARTRATE AND ACETAMINOPHEN 5; 325 MG/1; MG/1
1 TABLET ORAL EVERY 6 HOURS PRN
Status: DISCONTINUED | OUTPATIENT
Start: 2020-07-11 | End: 2020-07-13

## 2020-07-11 NOTE — PROGRESS NOTES
[place] [facility] - [facility] PROGRESS NOTE    [facility] Patient Status:  Inpatient    1970 MRN [id number]   Location [facility] 5SW/SE Attending Hayden Velazquez, [address] [facility] Day # 2 PCP Dione Castle MD     Subjective: fell out about two weeks ago but it had been draining minimally beforehand. No fevers or chills at home. Got repeat CT A/P at [facility] yesterday with continued abscess, and sent to [facility] ED.  On arrival, afebrile, wbc 10.9, UA with 288 wbcs, cx pending, bl

## 2020-07-11 NOTE — PROGRESS NOTES
[place] [facility] - [facility]    Progress Note    Lonnie Valencia Patient Status:  Inpatient    1970 MRN [id number]   Location [facility] 5SW/SE Attending Taya Gillis, [address] [facility] Day # 2 PCP Adilene Oh MD        Subjective:   Pt Sabrina Rodríguez MD on 7/10/2020 at 10:08 AM          Ct Abdomen Pelvis Iv Contrast, No Oral (er)    Result Date: 7/9/2020  CONCLUSION:  1. History of left emphysematous pyelonephritis with multifocal left renal abscesses.   A previously noted subcapsular posterior l kidney may be salvaged with drainage and abx. - Will continue to follow while in house. Please call if questions.          Daniel Jon MD, [facility]  Urologist  [place] [address]  Office: [phone]

## 2020-07-11 NOTE — PROGRESS NOTES
[facility] Hospitalist Progress Note   CC: follow-up hospital admission    ASSESSMENT/PLAN:  Tayla Asencio is a 49 yo M w/ PMHx of left emphysematous pyelonephritis w/ multifocal left renal abscesses, DM2, tobacco abuse, PSVT who presented for drain placement. kg)  06/14/20 : 172 lb (78 kg)  05/28/20 : 174 lb (78.9 kg)       General: No acute distress. Alert and oriented x 3. HEENT: Moist mucous membranes. EOM-I. PERRL  Neck: No lymphadenopathy. No JVD. No carotid bruits.   Respiratory: Clear to auscultation bi

## 2020-07-11 NOTE — CM/SW NOTE
Per Dr Elyse Renee pt may be ready for dc and plan may be to continue on Invanz as pta. CM confirmed with Ernestina at [facility] that pt will need prior auth from ins to continue abx. Auth can be requested from ins on Monday. Dr Elyse Renee txt paged with above.     Tiago Amado

## 2020-07-11 NOTE — PROGRESS NOTES
120 [facility] dosing service    Follow-up Pharmacokinetic Consult for Vancomycin Dosing     Marcos Camacho is a 48year old patient who is being treated for L Renal Abscess  S/p CT-guided percutaneous drainage of renal abscess.    Patient is on day 2 of Vanc need Scr daily while on Vancomycin to assess renal function. 4.  Pharmacy will follow and adjust as necessary. We appreciate the opportunity to assist in the care of this patient.     Devorah Ryan, [facility]  7/11/2020  9:28 AM  [address]

## 2020-07-12 LAB
GLUCOSE BLDC GLUCOMTR-MCNC: 126 MG/DL (ref 70–99)
GLUCOSE BLDC GLUCOMTR-MCNC: 126 MG/DL (ref 70–99)
GLUCOSE BLDC GLUCOMTR-MCNC: 145 MG/DL (ref 70–99)
GLUCOSE BLDC GLUCOMTR-MCNC: 234 MG/DL (ref 70–99)

## 2020-07-12 NOTE — PROGRESS NOTES
[place] [facility] - [facility]    Progress Note    Amber Salazar Patient Status:  Inpatient    1970 MRN [id number]   Location [facility] 5SW/SE Attending Crista Moss, [address] [facility] [address] Day # 3 PCP Vee Ny MD        Subjective:   Pt He is now POD 1 s/p IR drain replacement.    - No changes in recommendations.  - Recommend pt go home with drain and antibiotics. OK to d/c home from  standpoint. F/u in discharge tab.   - He should f/u with Dr Kvng Man as outpatient as well as IR for tube

## 2020-07-12 NOTE — PROGRESS NOTES
[facility] Hospitalist Progress Note   CC: follow-up hospital admission    ASSESSMENT/PLAN:  Baylee Velazco is a 49 yo M w/ PMHx of left emphysematous pyelonephritis w/ multifocal left renal abscesses, DM2, tobacco abuse, PSVT who presented for drain placement. 07/12 1159  In: 2220 [P.O.:720; I.V.:800]  Out: 0   Wt Readings from Last 3 Encounters:  07/09/20 : 184 lb 6.4 oz (83.6 kg)  06/14/20 : 172 lb (78 kg)  05/28/20 : 174 lb (78.9 kg)       General: No acute distress. Alert and oriented x 3.   HEENT: Moist muco

## 2020-07-12 NOTE — PLAN OF CARE
Problem: Diabetes/Glucose Control  Goal: Glucose maintained within prescribed range  Description  INTERVENTIONS:  - Monitor Blood Glucose as ordered  - Assess for signs and symptoms of hyperglycemia and hypoglycemia  - Administer ordered medications to m Consider cultural and social influences on pain and pain management  - Manage/alleviate anxiety  - Utilize distraction and/or relaxation techniques  - Monitor for opioid side effects  - Notify MD/LIP if interventions unsuccessful or patient reports new chelle achieve adequate hemostasis  - Assess for signs and symptoms of internal bleeding  - Monitor lab trends  - Patient is to report abnormal signs of bleeding to staff  - Avoid use of toothpicks and dental floss  - Use electric shaver for shaving  - Use soft b

## 2020-07-13 LAB
ANION GAP SERPL CALC-SCNC: 2 MMOL/L (ref 0–18)
BASOPHILS # BLD AUTO: 0.05 X10(3) UL (ref 0–0.2)
BASOPHILS NFR BLD AUTO: 0.6 %
BUN BLD-MCNC: 12 MG/DL (ref 7–18)
BUN/CREAT SERPL: 14.1 (ref 10–20)
CALCIUM BLD-MCNC: 9.1 MG/DL (ref 8.5–10.1)
CHLORIDE SERPL-SCNC: 106 MMOL/L (ref 98–112)
CO2 SERPL-SCNC: 31 MMOL/L (ref 21–32)
CREAT BLD-MCNC: 0.85 MG/DL (ref 0.7–1.3)
DEPRECATED RDW RBC AUTO: 49.2 FL (ref 35.1–46.3)
EOSINOPHIL # BLD AUTO: 0.25 X10(3) UL (ref 0–0.7)
EOSINOPHIL NFR BLD AUTO: 2.9 %
ERYTHROCYTE [DISTWIDTH] IN BLOOD BY AUTOMATED COUNT: 15.9 % (ref 11–15)
GLUCOSE BLD-MCNC: 88 MG/DL (ref 70–99)
GLUCOSE BLDC GLUCOMTR-MCNC: 112 MG/DL (ref 70–99)
GLUCOSE BLDC GLUCOMTR-MCNC: 117 MG/DL (ref 70–99)
GLUCOSE BLDC GLUCOMTR-MCNC: 132 MG/DL (ref 70–99)
GLUCOSE BLDC GLUCOMTR-MCNC: 155 MG/DL (ref 70–99)
HCT VFR BLD AUTO: 29.8 % (ref 39–53)
HGB BLD-MCNC: 10.1 G/DL (ref 13–17.5)
IMM GRANULOCYTES # BLD AUTO: 0.02 X10(3) UL (ref 0–1)
IMM GRANULOCYTES NFR BLD: 0.2 %
LYMPHOCYTES # BLD AUTO: 2.59 X10(3) UL (ref 1–4)
LYMPHOCYTES NFR BLD AUTO: 30.4 %
MCH RBC QN AUTO: 28.8 PG (ref 26–34)
MCHC RBC AUTO-ENTMCNC: 33.9 G/DL (ref 31–37)
MCV RBC AUTO: 84.9 FL (ref 80–100)
MONOCYTES # BLD AUTO: 0.84 X10(3) UL (ref 0.1–1)
MONOCYTES NFR BLD AUTO: 9.8 %
NEUTROPHILS # BLD AUTO: 4.78 X10 (3) UL (ref 1.5–7.7)
NEUTROPHILS # BLD AUTO: 4.78 X10(3) UL (ref 1.5–7.7)
NEUTROPHILS NFR BLD AUTO: 56.1 %
OSMOLALITY SERPL CALC.SUM OF ELEC: 287 MOSM/KG (ref 275–295)
PLATELET # BLD AUTO: 271 10(3)UL (ref 150–450)
POTASSIUM SERPL-SCNC: 3.6 MMOL/L (ref 3.5–5.1)
POTASSIUM SERPL-SCNC: 4.2 MMOL/L (ref 3.5–5.1)
RBC # BLD AUTO: 3.51 X10(6)UL (ref 4.3–5.7)
SODIUM SERPL-SCNC: 139 MMOL/L (ref 136–145)
WBC # BLD AUTO: 8.5 X10(3) UL (ref 4–11)

## 2020-07-13 PROCEDURE — 99231 SBSQ HOSP IP/OBS SF/LOW 25: CPT | Performed by: NURSE PRACTITIONER

## 2020-07-13 RX ORDER — ACETAMINOPHEN 500 MG
1000 TABLET ORAL EVERY 8 HOURS
Refills: 0 | Status: SHIPPED | COMMUNITY
Start: 2020-07-13

## 2020-07-13 RX ORDER — ACETAMINOPHEN 500 MG
1000 TABLET ORAL EVERY 8 HOURS
Status: DISCONTINUED | OUTPATIENT
Start: 2020-07-13 | End: 2020-07-14

## 2020-07-13 RX ORDER — HYDROCODONE BITARTRATE AND ACETAMINOPHEN 5; 325 MG/1; MG/1
1 TABLET ORAL EVERY 6 HOURS PRN
Qty: 10 TABLET | Refills: 0 | Status: SHIPPED | OUTPATIENT
Start: 2020-07-13

## 2020-07-13 RX ORDER — GLIPIZIDE 5 MG/1
5 TABLET ORAL
Qty: 60 TABLET | Refills: 0 | Status: SHIPPED | OUTPATIENT
Start: 2020-07-13

## 2020-07-13 RX ORDER — POTASSIUM CHLORIDE 20 MEQ/1
40 TABLET, EXTENDED RELEASE ORAL EVERY 4 HOURS
Status: COMPLETED | OUTPATIENT
Start: 2020-07-13 | End: 2020-07-13

## 2020-07-13 RX ORDER — METFORMIN HYDROCHLORIDE 500 MG/1
500 TABLET, EXTENDED RELEASE ORAL 2 TIMES DAILY WITH MEALS
Qty: 60 TABLET | Refills: 0 | Status: SHIPPED | OUTPATIENT
Start: 2020-07-13

## 2020-07-13 RX ORDER — HYDROCODONE BITARTRATE AND ACETAMINOPHEN 5; 325 MG/1; MG/1
1 TABLET ORAL EVERY 6 HOURS PRN
Qty: 10 TABLET | Refills: 0 | Status: SHIPPED | OUTPATIENT
Start: 2020-07-13 | End: 2020-07-13

## 2020-07-13 NOTE — PLAN OF CARE
Problem: Diabetes/Glucose Control  Goal: Glucose maintained within prescribed range  Description  INTERVENTIONS:  - Monitor Blood Glucose as ordered  - Assess for signs and symptoms of hyperglycemia and hypoglycemia  - Administer ordered medications to m measures as appropriate and evaluate response  - Consider cultural and social influences on pain and pain management  - Manage/alleviate anxiety  - Utilize distraction and/or relaxation techniques  - Monitor for opioid side effects  - Notify MD/LIP if inte symptoms of electrolyte imbalances  - Administer electrolyte replacement as ordered  - Monitor response to electrolyte replacements, including rhythm and repeat lab results as appropriate  - Fluid restriction as ordered  - Instruct patient on fluid and nut

## 2020-07-13 NOTE — CM/SW NOTE
Addendum :  TRE faxed clinicals to [facility] @ [phone]. Per Mart Sauceda, drug will require insurance auth. Addendum 911:  Per Ranjeet Case will provide weekly dressing changes. TRE informed that pt will require weekly wound dressing changes.   Next change

## 2020-07-13 NOTE — DISCHARGE SUMMARY
[facility] Hospitalist Discharge Summary   Patient ID:  Bryanna Fish  [id number]  29 year old  1/29/1970    Admit date: 7/9/2020  Discharge date: 7/13/2020    Primary Care Physician: Sherryle Forts, MD   Attending Physician: DO Tamiko Garnda et al, follow up as outpt      Tobacco Abuse   -reinforced smoking cessation      PSVT  -echo with Ef 50%  -continue toprol       EXAM:   GENERAL: no apparent distress  NEURO: A/A Ox3  RESP: non labored, CTA  CARDIO: Regular, no murmur  ABD: soft, NT, ND, [address] [facility] [address] [phone]                 Disposition: home  Discharged Condition: stable       Additional patient instructions:  Dressing change to drain weekly and PICC    Follow up CT as directed by Dr Joseph Anderson and Dr Dulce Maria Greene follow up   - resume home meds     Time spent - 35min    Marcelyn Councilman, DO  [facility] Hospitalist  Pager: [phone]  Answering Service: [phone]

## 2020-07-13 NOTE — PROGRESS NOTES
[address] [facility] Urology  Progress Note    Dannyjimenez Osborne Patient Status:  Inpatient    1970 MRN [id number]   Location [facility] 5SW/SE Attending Anabella Cárdenas, [address] [facility] Day # 4 PCP Odell Dewitt MD GLU 88 07/13/2020    CA 9.1 07/13/2020    ALB 1.7 (L) 05/20/2020    ALKPHO 111 05/18/2020    BILT 0.5 05/18/2020    TP 6.8 05/18/2020    AST 28 05/18/2020    ALT 20 05/18/2020    INR 1.14 05/17/2020    MG 2.1 05/20/2020    PHOS 2.5 05/20/2020

## 2020-07-13 NOTE — PROGRESS NOTES
[facility] Hospitalist Team  Progress Note    Mallory Watkins Patient Status:  Inpatient    1970 MRN [id number]   Location [facility] 5SW/SE Attending Nalini Rothman, [address] [facility] Day # 4 PCP Bertha Degroot MD     CC: Follow Up  PCP: Kailyn Napier hurting him, tells me he is taking norco and cant drive with norco. Tells me he doesn't think drain is helping so why don't we pull it.  Informed pt of benefit of drain, told pt we need to keep in to facilitate improvement in infection otherwise we will not MG/ML injection 50 mcg, 50 mcg, Intravenous, Q5 Min PRN  Flumazenil (ROMAZICON) injection 0.2 mg, 0.2 mg, Intravenous, PRN  glucose (DEX4) oral liquid 15 g, 15 g, Oral, Q15 Min PRN    Or  Glucose-Vitamin C (DEX-4) chewable tab 4 tablet, 4 tablet, Oral, Q15 Cristian Stokes

## 2020-07-13 NOTE — PROGRESS NOTES
[facility] ID PROGRESS NOTE    Reynaldo  Patient Status:  Inpatient    1970 MRN [id number]   Location [facility] 5SW/SE Attending Marie Salazar, [address] [facility] Day # 4 PCP Yenni Aguilar MD     Subjective: with PICC in place. Saw Dr. Mikhail Khan in clinic last week. States drain fell out about two weeks ago but it had been draining minimally beforehand. No fevers or chills at home.  Got repeat CT A/P at [facility] yesterday with continued abscess, and sent to [facility]

## 2020-07-14 VITALS
BODY MASS INDEX: 29.63 KG/M2 | TEMPERATURE: 98 F | OXYGEN SATURATION: 98 % | HEART RATE: 77 BPM | DIASTOLIC BLOOD PRESSURE: 79 MMHG | RESPIRATION RATE: 18 BRPM | HEIGHT: 66 IN | WEIGHT: 184.38 LBS | SYSTOLIC BLOOD PRESSURE: 129 MMHG

## 2020-07-14 LAB
GLUCOSE BLDC GLUCOMTR-MCNC: 119 MG/DL (ref 70–99)
GLUCOSE BLDC GLUCOMTR-MCNC: 129 MG/DL (ref 70–99)
GLUCOSE BLDC GLUCOMTR-MCNC: 139 MG/DL (ref 70–99)

## 2020-07-14 NOTE — DISCHARGE SUMMARY
[facility] Hospitalist Discharge Summary   Patient ID:  Reyna Cardenas  [id number]  86 year old  1/29/1970    Admit date: 7/9/2020  Discharge date: 7/14/2020    Primary Care Physician: Juana Petersen MD   Attending Physician: Leobardo Fabry, DO Consul et al, follow up as outpt      Tobacco Abuse   -reinforced smoking cessation      PSVT  -echo with Ef 50%  -continue toprol       EXAM:   GENERAL: no apparent distress  NEURO: A/A Ox3  RESP: non labored, CTA  CARDIO: Regular, no murmur  ABD: soft, NT, ND, You can get these medications from any pharmacy    Bring a paper prescription for each of these medications  · ertapenem 1 g 1 g in sodium chloride 0.9% 100 mL       Important follow up: Follow-up Information     Paramjit Ortiz MD In 3 weeks.     Graciela Recent Labs   Lab 07/09/20 2023 07/11/20  0533 07/13/20  0555 07/13/20  1938   * 84 88  --    BUN 14 11 12  --    CREATSERUM 0.91 0.79 0.85  --    GFRAA 113 121 117  --    GFRNAA 98 105 102  --    CA 9.8 8.9 9.1  --     140 139  --    K

## 2020-07-14 NOTE — PLAN OF CARE
Minimal output from ANDER drain. Moderate pain managed with scheduled Tylenol. VSS.     Problem: Diabetes/Glucose Control  Goal: Glucose maintained within prescribed range  Description  INTERVENTIONS:  - Monitor Blood Glucose as ordered  - Assess for signs and response  - Implement non-pharmacological measures as appropriate and evaluate response  - Consider cultural and social influences on pain and pain management  - Manage/alleviate anxiety  - Utilize distraction and/or relaxation techniques  - Monitor for op signs and symptoms of electrolyte imbalances  - Administer electrolyte replacement as ordered  - Monitor response to electrolyte replacements, including rhythm and repeat lab results as appropriate  - Fluid restriction as ordered  - Instruct patient on flu

## 2020-07-14 NOTE — CM/SW NOTE
[facility] is agreeable to d/c pt today prior to receiving insurance authorization. Pt is to d/c home today and will have appointment scheduled in the [facility] office on 7/15 per [place].     [address] [address],

## 2020-07-15 ENCOUNTER — TELEPHONE (OUTPATIENT)
Dept: MEDSURG UNIT | Facility: HOSPITAL | Age: 50
End: 2020-07-15

## 2020-07-15 NOTE — PAYOR COMM NOTE
--------------  DISCHARGE REVIEW    Payor: Tatiana Toroabdulkadir #:  [id number]  Authorization Number: [id number]    Admit date: 7/9/20  Admit time:  2257  Discharge Date: 7/14/2020  1:17 PM     Admitting Physician: Shellie Crigler, MD  Attending Physician:  Camilla alcantar abx with previous left drain (fell out 2 weeks ago) pt now admitted due to ongoing ongoing abscess, new drain placed by IR, plans to continue ertapenum with follow up CT as outpt. Pt may still need nephrectomy.  Pt to follow up with ID and urology, see stephanie known as:  GLUCOPHAGE-XR  Take 1 tablet (500 mg total) by mouth 2 (two) times daily with meals. metoprolol Tartrate 25 MG Tabs  Commonly known as:  LOPRESSOR  Take 1 tablet (25 mg total) by mouth 2x Daily(Beta Blocker).         STOP taking these medicat [phone]  7/14/2020      SEE ATTENDING NOTE BELOW      Patient seen and examined independently and discussed w/ ERROL Serrano.      S: no new complaints, wants to go home    O: /88 (BP Location: Left arm)   Pulse 64   Temp 97.6 °F (36.4 °C) (O

## 2020-07-20 ENCOUNTER — TELEPHONE (OUTPATIENT)
Dept: SURGERY | Facility: CLINIC | Age: 50
End: 2020-07-20

## 2020-09-30 ENCOUNTER — TELEPHONE (OUTPATIENT)
Dept: SURGERY | Facility: CLINIC | Age: 50
End: 2020-09-30

## 2020-10-23 NOTE — PLAN OF CARE
[facility]    October 23, 2020    Vladimir Monae  [address]  [address] [facility] [address]      Dear Vladimir Monae,    This is in regards to your visit at the [facility] on 10/23/2020 You had some lab work and/or x-rays done and we have attempted to contact you by phone to give you the results. We would also like to know how you are doing since your visit with us.    __x_  Your test results are normal (negative).    ___  Your test results are positive. You were given the correct medication for your condition. Please finish the medication and follow up with your family doctor as needed.    ___  Your test results are positive. The medication we prescribed needs to be changed. Please call us as soon as possible with your pharmacy phone number.    ___  Your test results are positive. Please call us at soon as possible.    Please call [facility] at Dept: [phone]      Sincerely,    [facility]  Dept: [phone]   Problem: Patient Centered Care  Goal: Patient preferences are identified and integrated in the patient's plan of care  Description  Interventions:  - What would you like us to know as we care for you?  \"I feel a lot better today\"  - Provide timely, comp Anticipate increased pain with activity and pre-medicate as appropriate  Outcome: Progressing     Problem: SAFETY ADULT - FALL  Goal: Free from fall injury  Description  INTERVENTIONS:  - Assess pt frequently for physical needs  - Identify cognitive and ph indicated or ordered  - Monitor response to interventions for patient's volume status, including labs, urine output, blood pressure (other measures as available)  - Encourage oral intake as appropriate  - Instruct patient on fluid and nutrition restriction

## 2024-02-19 ENCOUNTER — HOSPITAL ENCOUNTER (EMERGENCY)
Facility: HOSPITAL | Age: 54
Discharge: HOME | End: 2024-02-19
Attending: EMERGENCY MEDICINE
Payer: MEDICAID

## 2024-02-19 VITALS
DIASTOLIC BLOOD PRESSURE: 113 MMHG | OXYGEN SATURATION: 97 % | HEART RATE: 78 BPM | SYSTOLIC BLOOD PRESSURE: 168 MMHG | TEMPERATURE: 98 F | RESPIRATION RATE: 18 BRPM

## 2024-02-19 DIAGNOSIS — E11.42 DIABETIC PERIPHERAL NEUROPATHY (HCC): Primary | ICD-10-CM

## 2024-02-19 PROCEDURE — 99284 EMERGENCY DEPT VISIT MOD MDM: CPT

## 2024-02-19 PROCEDURE — 99283 EMERGENCY DEPT VISIT LOW MDM: CPT

## 2024-02-19 RX ORDER — GABAPENTIN 100 MG/1
100 CAPSULE ORAL ONCE
Status: COMPLETED | OUTPATIENT
Start: 2024-02-19 | End: 2024-02-19

## 2024-02-19 RX ORDER — GABAPENTIN 100 MG/1
100 CAPSULE ORAL 3 TIMES DAILY
Qty: 90 CAPSULE | Refills: 0 | Status: SHIPPED | OUTPATIENT
Start: 2024-02-19

## 2024-02-20 NOTE — ED PROVIDER NOTES
Patient Seen in: [facility] Emergency Department    History     Chief Complaint   Patient presents with    Foot Pain       HPI    54-year-old male with past medical history significant for diabetes, high blood pressure, SVT, renal abscess, presents to the ED for evaluation of pain in his feet.  Patient states he has been having some chronic pain in his feet however today seem to be the worst.  He is experiencing burning sensation with pins-and-needles.  Pain is present at rest and with ambulation.  No trauma, fevers, chills, muscle weakness.    History from Independent Source:       External Records Reviewed: Patient's most recent hemoglobin A1c in the computer is from 2020 and patient had a level of 14 followed by 8.2 and that year.    History reviewed.   Past Medical History:   Diagnosis Date    Diabetes (HCC)     Essential hypertension     PSVT (paroxysmal supraventricular tachycardia)     Pyelonephritis     Renal abscess        History reviewed.   Past Surgical History:   Procedure Laterality Date    CHOLECYSTECTOMY           Medications :  (Not in a hospital admission)       Family History   Problem Relation Age of Onset    Diabetes Mother        Smoking Status:   Social History     Socioeconomic History    Marital status:    Tobacco Use    Smoking status: Every Day     Packs/day: 1.00     Years: 30.00     Additional pack years: 0.00     Total pack years: 30.00     Types: Cigarettes    Smokeless tobacco: Never   Vaping Use    Vaping Use: Never used   Substance and Sexual Activity    Alcohol use: Yes    Drug use: Never       Constitutional and vital signs reviewed.      Social History and Family History elements reviewed from today, pertinent positives to the presenting problem noted.    Physical Exam     ED Triage Vitals [02/19/24 2011]   BP (!) 186/99   Pulse 96   Resp 18   Temp 97.6 °F (36.4 °C)   Temp src Temporal   SpO2 97 %   O2 Device None (Room air)       Physical Exam   Constitutional:  AAOx3, well nourished, NAD  HEENT: Normocephalic, PERRLA, MMM  CV: s1s2+, RRR, no m/r/g, normal distal pulses  Pulmonary/Chest: CTA b/l with no rales, wheezes.  No chest wall tenderness  Abdominal: Nontender.  Nondistended. Soft. Bowel sounds are normal.   Neck/Back:   :   Musculoskeletal: Feet with normal range of motion. No deformity.  There is no evidence of erythema, edema, trauma to the feet.  Neurological: Awake, alert. Normal reflexes. No cranial nerve deficit.    Skin: Skin is warm and dry. No rash noted. No erythema.   Psychiatric:      All measures to prevent infection transmission during my interaction with the patient were taken. The patient was already wearing a droplet mask on my arrival to the room. Personal protective equipment was worn throughout the duration of the exam.      ED Course      Labs Reviewed - No data to display  My Independent Interpretation of EKG (if performed):     Imaging Results Available and Reviewed while in ED: No results found.  ED Medications Administered: Medications - No data to display          MDM     Vitals:    02/19/24 2011 02/19/24 2131   BP: (!) 186/99 (!) 168/113   Pulse: 96 78   Resp: 18    Temp: 97.6 °F (36.4 °C)    TempSrc: Temporal    SpO2: 97% 97%     *I personally reviewed and interpreted all ED vitals.    Independent Interpretation of Studies:     Social Determinants of Health:     Procedures:      Differential/MDM/Shared Decision Making: Differential Diagnosis includes plantar fasciitis, hematoma, cellulitis, others.      The patient already has diabetes, high blood pressure, SVT, to contribute to the complexity of this ED evaluation.           Patient is likely having diabetic peripheral neuropathy.  Discussed case with patient and will start him on gabapentin and have him follow-up with his primary care doctor.      Condition upon leaving the department: Stable    Disposition and Plan     Clinical Impression:  1. Diabetic peripheral neuropathy (HCC)         Disposition:  Discharge    Follow-up:  Torres Dao MD  [address] [facility] [address] [phone]    Call in 2 day(s)        Medications Prescribed:  Current Discharge Medication List        START taking these medications    Details   gabapentin 100 MG Oral Cap Take 1 capsule (100 mg total) by mouth 3 (three) times daily.  Qty: 90 capsule, Refills: 0

## 2024-02-20 NOTE — ED INITIAL ASSESSMENT (HPI)
Pt presents to ed with c/o bilateral foot pain. Pt states the pain came out of nowhere. Pt states he has had foot pain in the past but this time it is the worst pain ever. Ibuprofen taken around 6pm with no relief.

## 2024-02-20 NOTE — DISCHARGE INSTRUCTIONS
Please take gabapentin for the peripheral neuropathy.  On the first day you take the medicine, take it before bedtime.  On the second day, take it in the morning and take it once again before bedtime.  On the third day, take it in the morning, at lunch, and at bedtime.  You will subsequently take it 3 times a day.

## (undated) NOTE — LETTER
[address], [place]  Authorization for Invasive Procedures  1.  I hereby authorize Dr. Cass Amaro, my physician and whomever may be designated as the doctor's assistant, to perform the following operation and/or procedureCT guided abdom performed for the purposes of advancing medicine, science, and/or education, provided my identity is not revealed. If the procedure has been videotaped, the physician/surgeon will obtain the original videotape.  The hospital will not be responsible for stor My signature below affirms that prior to the time of the procedure, I have explained to the patient and/or his legal representative, the risks and benefits involved in the proposed treatment and any reasonable alternative to the proposed treatment.  I have

## (undated) NOTE — LETTER
7/9/2020              [facility]        [address]        [facility] [address]     Malik Mccormick,  Our clinic staff had been trying to reach you. Please see the message outlined below, that [facility] sent to our staff on 6/22/2020.  Please call our Jagdish Ojeda MD  [address] [place] [address], [address]  [facility]  [phone]

## (undated) NOTE — LETTER
[address], [place]  Authorization for Invasive Procedures  1.  I hereby authorize Dr. Rhea Hall, my physician and whomever may be designated as the doctor's assistant, to perform the following operation and/or procedure:  Renal following are some, but not all, of the potential risks that can occur: fever and allergic reactions, hemolytic reactions, transmission of disease such as hepatitis, AIDS, cytomegalovirus (CMV), and flluid overload.  In the event that I wish to have autolog administration of sedation/analgesia as may be necessary or desirable in the judgment of my physician.      Signature of Patient:  ________________________________________________ Date: _________Time: _________    Responsible person in case of minor or unco